# Patient Record
Sex: MALE | Race: WHITE | NOT HISPANIC OR LATINO | Employment: OTHER | URBAN - NONMETROPOLITAN AREA
[De-identification: names, ages, dates, MRNs, and addresses within clinical notes are randomized per-mention and may not be internally consistent; named-entity substitution may affect disease eponyms.]

---

## 2021-02-02 ENCOUNTER — IMMUNIZATION (OUTPATIENT)
Dept: VACCINE CLINIC | Facility: HOSPITAL | Age: 71
End: 2021-02-02

## 2021-02-02 PROCEDURE — 0001A: CPT | Performed by: INTERNAL MEDICINE

## 2021-02-02 PROCEDURE — 91300 HC SARSCOV02 VAC 30MCG/0.3ML IM: CPT | Performed by: INTERNAL MEDICINE

## 2021-02-23 ENCOUNTER — IMMUNIZATION (OUTPATIENT)
Dept: VACCINE CLINIC | Facility: HOSPITAL | Age: 71
End: 2021-02-23

## 2021-02-23 PROCEDURE — 91300 HC SARSCOV02 VAC 30MCG/0.3ML IM: CPT | Performed by: INTERNAL MEDICINE

## 2021-02-23 PROCEDURE — 0002A: CPT | Performed by: INTERNAL MEDICINE

## 2022-12-06 ENCOUNTER — TELEPHONE (OUTPATIENT)
Dept: CARDIOLOGY | Facility: CLINIC | Age: 72
End: 2022-12-06

## 2022-12-06 NOTE — TELEPHONE ENCOUNTER
For the HUB to read to pt:       LEFT VOICEMAIL FOR PT TO CALL BACK TO CONFIRM APPT, IF PT CALLS BACK PLEASE PUT CALL THROUGH. THANK YOU

## 2022-12-08 ENCOUNTER — OFFICE VISIT (OUTPATIENT)
Dept: CARDIOLOGY | Facility: CLINIC | Age: 72
End: 2022-12-08

## 2022-12-08 VITALS
SYSTOLIC BLOOD PRESSURE: 154 MMHG | WEIGHT: 183.4 LBS | HEIGHT: 71 IN | OXYGEN SATURATION: 100 % | BODY MASS INDEX: 25.68 KG/M2 | DIASTOLIC BLOOD PRESSURE: 83 MMHG | HEART RATE: 69 BPM

## 2022-12-08 DIAGNOSIS — R00.2 PALPITATIONS: ICD-10-CM

## 2022-12-08 DIAGNOSIS — R07.2 CHEST PAIN, PRECORDIAL: ICD-10-CM

## 2022-12-08 DIAGNOSIS — R42 DIZZY: ICD-10-CM

## 2022-12-08 DIAGNOSIS — M79.89 LEG SWELLING: ICD-10-CM

## 2022-12-08 DIAGNOSIS — I10 PRIMARY HYPERTENSION: Primary | ICD-10-CM

## 2022-12-08 PROCEDURE — 99204 OFFICE O/P NEW MOD 45 MIN: CPT | Performed by: NURSE PRACTITIONER

## 2022-12-08 RX ORDER — DOXAZOSIN MESYLATE 4 MG/1
4 TABLET ORAL NIGHTLY
COMMUNITY
End: 2022-12-08 | Stop reason: SDUPTHER

## 2022-12-08 RX ORDER — DONEPEZIL HYDROCHLORIDE 5 MG/1
5 TABLET, FILM COATED ORAL NIGHTLY
COMMUNITY

## 2022-12-08 RX ORDER — POTASSIUM CHLORIDE 750 MG/1
10 TABLET, FILM COATED, EXTENDED RELEASE ORAL 2 TIMES DAILY
COMMUNITY

## 2022-12-08 RX ORDER — LISINOPRIL 40 MG/1
40 TABLET ORAL DAILY
COMMUNITY

## 2022-12-08 RX ORDER — FINASTERIDE 5 MG/1
5 TABLET, FILM COATED ORAL DAILY
COMMUNITY

## 2022-12-08 RX ORDER — MIRTAZAPINE 15 MG/1
15 TABLET, FILM COATED ORAL NIGHTLY
COMMUNITY

## 2022-12-08 RX ORDER — METOPROLOL SUCCINATE 25 MG/1
25 TABLET, EXTENDED RELEASE ORAL DAILY
COMMUNITY

## 2022-12-08 RX ORDER — CETIRIZINE HYDROCHLORIDE 10 MG/1
10 TABLET ORAL DAILY
COMMUNITY

## 2022-12-08 RX ORDER — ASPIRIN 81 MG/1
81 TABLET ORAL DAILY
COMMUNITY

## 2022-12-08 RX ORDER — DOXAZOSIN 8 MG/1
8 TABLET ORAL NIGHTLY
Qty: 90 TABLET | Refills: 3 | Status: SHIPPED | OUTPATIENT
Start: 2022-12-08 | End: 2023-02-24 | Stop reason: SDUPTHER

## 2022-12-08 RX ORDER — FUROSEMIDE 20 MG/1
20 TABLET ORAL DAILY
COMMUNITY

## 2022-12-08 RX ORDER — MAG HYDROX/ALUMINUM HYD/SIMETH 400-400-40
SUSPENSION, ORAL (FINAL DOSE FORM) ORAL
COMMUNITY

## 2022-12-08 RX ORDER — BUSPIRONE HYDROCHLORIDE 5 MG/1
5 TABLET ORAL DAILY
COMMUNITY

## 2022-12-08 NOTE — PROGRESS NOTES
"Subjective     Miguel Allen is a 72 y.o. male who presents to day for Edema (Lower extremity edema).    CHIEF COMPLIANT  Chief Complaint   Patient presents with   • Edema     Lower extremity edema       Active Problems:  Problem List Items Addressed This Visit    None      HPI  HPI    Mr. Miguel Allen is a 72-year-old male who presents today to establish care for cardiac evaluation due to lower extremity edema and angina. Patient does have chronic arterial hypertension, which he is treated with Cardura, lisinopril, metoprolol. Today, his blood pressure is elevated at 154/83 mmHg, heart rate of 69. He is on antiplatelet therapy of aspirin 81 mg daily. Diuretic therapy of Lasix 20 mg daily. He is accompanied by an adult female.    The patient denies feeling like \" someone is sitting on his chest\". He states it feels like something he know but his mother used to call his winter-summer cold and then it gets cold then he'll start to experience edema in his lower extremity. The adult female states he had a severe episode previously where he had gotten up and down frequently, had taken Tums sits down and feels discomfort in his chest area. She states she did not know if it was a muscular issue, as his symptoms were onset a few months ago. Notes he has a history of various issues, had a abdominal aneurysm surgery, bladder issues, prostate issues, kidney stone issues, everything had come at one time. She had thought he had a stroke, states they were in the house one day and all of a sudden, he comes over to the kitchen and he just started experiencing dysarthria.     She conducted a stroke test (FAST)  and decided she needed some help so she called the ambulance and the patient was rushed to the hospital. She mentions she checked for a stroke but could not find anything. Notes his blood pressure was 280/125 mmHg so they had started to lower it slowly and he had started to feel relieved within a couple hours, then " he started experiencing urinary incontinence. She states a prostate ultrasound was conducted and it was found that his prostate had covered his bladder, so that is what they believed caused the hypertensive blood pressure reading.    Mentions he was admitted in the hospital and during the ultrasound scan they had found an abdominal aortic aneurysm of 6.1 cm and did not even know it was there. Notes they didn't want to do anything till Tuesday, that encounter was on previous Friday. So Dr. Escalante got him in on Tuesday and then they went to the neurologist who came in and said they were not going to do anything about the prostate at the time because he had been through so much. And so the adult female states he said we'll still do anything about the bladder and get the aneurysm all settled and taken care of so then they went to him and he said to keep him on the medicines to see if we can get it down so he can use the bathroom okay.    She states during the meantime, they felt like he had a old kidney stone had lodged in his left kidney, destroyed the kidney, been there about they figured about 10 years and didn't know it. So all this pain she believes over the 10-year period of his back hurting all the time and his left flank pain was coming from that kidney stone but the doctor said let's don't do anything because that's going to upset the prostate and the bladder and his mind was already gone.     But then now she told Dr. More last week she noticed he don't use his left side, he won't grab anything from his left side, and he won't look to his left side.  If something sitting in the counter and she pointed out to him he won't look left and he won't go to the bathroom. The patient would ask where is the bathroom, but she know he said to look on the left, he'll look on the right instead. She notes Dr. More said it's called neglect.     But when I checked in the neglect it has something to do with his brain lobe up  here. So he is. States 10 years ago, they had gotten referred to a cardiologist because he would experience dyspnea upon exertion when walking up a hill so she suggested to get it checked out but they did not find anything. She states that currently they do not do enough to put stress on his body because he has Parkinson's as well as dementia, he does not do enough to exert his body since he does not do anything around the house anymore.    The patient states he used to run about 2 miles a day. He denies experiencing any dyspnea currently, but it does come into play if he does exert himself a little. She had put up with his symptoms for a little while thinking it was a muscular or skeletal problem but then the patient started talking about how his symptoms are affecting him more than usual. At night when she would lay down, he will really think it's hurting and he will get up for a while and walk around. She states she has noticed some lower extremity edema, she would give him Lasix and the edema will eventually go down. Notes she had given him 1 tablet this morning.     The adult female states they have been faithful about presenting at doctor appointments and nothing had shown up during his tests. Medical issues always showed up after his test results showed normal. She states Dr. More said the patient blood pressure reading are still too high, and he would need to be seen by a cardiologist as soon as possible. Denied any positive results from testing that was conducted 10 years ago. She mentions back then he wasn't apart of his medical care, but she believes he worked with Dr. Corey at the time.    The adult female agrees to having diagnostic testing conducted for the patient.      PRIOR MEDS  Current Outpatient Medications on File Prior to Visit   Medication Sig Dispense Refill   • aspirin 81 MG EC tablet Take 81 mg by mouth Daily.     • busPIRone (BUSPAR) 5 MG tablet Take 5 mg by mouth Daily.     •  cetirizine (zyrTEC) 10 MG tablet Take 10 mg by mouth Daily.     • Cinnamon 500 MG tablet Take 500 mg by mouth Daily. 2 tabs po qd     • DICLOFENAC PO Take 50 mg by mouth 2 (Two) Times a Day.     • donepezil (ARICEPT) 5 MG tablet Take 5 mg by mouth Every Night.     • doxazosin (CARDURA) 4 MG tablet Take 4 mg by mouth Every Night.     • finasteride (PROSCAR) 5 MG tablet Take 5 mg by mouth Daily.     • furosemide (LASIX) 20 MG tablet Take 20 mg by mouth Daily.     • lisinopril (PRINIVIL,ZESTRIL) 40 MG tablet Take 40 mg by mouth Daily.     • metoprolol succinate XL (TOPROL-XL) 25 MG 24 hr tablet Take 25 mg by mouth Daily.     • mirtazapine (REMERON) 15 MG tablet Take 15 mg by mouth Every Night.     • Omega-3 Fatty Acids (FISH OIL PO) Take 1,400 mg by mouth Daily.     • potassium chloride 10 MEQ CR tablet Take 10 mEq by mouth 2 (Two) Times a Day.     • Saw Palmetto 450 MG capsule Take  by mouth.     • Turmeric (QC TUMERIC COMPLEX PO) Take 500 mg by mouth Daily.     • VITAMIN D, CHOLECALCIFEROL, PO Take 125 mcg by mouth Daily.       No current facility-administered medications on file prior to visit.       ALLERGIES  Patient has no known allergies.    HISTORY  Past Medical History:   Diagnosis Date   • Aneurysm (HCC)    • Arthritis    • Colon polyps    • Gall stones    • GERD (gastroesophageal reflux disease)    • Hyperlipidemia    • Hypertension    • Kidney stones    • Lewy body dementia (HCC)    • Parkinson's disease (HCC)    • Stroke (HCC)        Social History     Socioeconomic History   • Marital status:    Tobacco Use   • Smoking status: Former     Packs/day: 1.00     Years: 10.00     Pack years: 10.00     Types: Cigarettes   • Smokeless tobacco: Never   Vaping Use   • Vaping Use: Never used   Substance and Sexual Activity   • Alcohol use: Never   • Drug use: Never   • Sexual activity: Defer       Family History   Problem Relation Age of Onset   • Dementia Mother    • Hypertension Father    • Diabetes Father   "      Review of Systems   Constitutional: Negative for chills, fatigue and fever.   HENT: Negative for congestion, rhinorrhea and sore throat.    Respiratory: Positive for chest tightness. Negative for shortness of breath.    Cardiovascular: Positive for chest pain (ffels like something sitting on chest  feels better to push on chest ), palpitations and leg swelling.   Gastrointestinal: Positive for constipation.   Musculoskeletal: Positive for back pain, myalgias, neck pain and neck stiffness.   Allergic/Immunologic: Negative for environmental allergies and food allergies.   Neurological: Positive for light-headedness (motion  causes this  or getting up to quickly). Negative for dizziness, syncope and weakness.   Hematological: Does not bruise/bleed easily.   Psychiatric/Behavioral: Positive for confusion (has Lewy Body  and parkinsons), decreased concentration and hallucinations. The patient is nervous/anxious.        Objective     VITALS: /83 (BP Location: Right arm, Patient Position: Sitting)   Pulse 69   Ht 180.3 cm (71\")   Wt 83.2 kg (183 lb 6.4 oz)   SpO2 100%   BMI 25.58 kg/m²     LABS:   Lab Results (most recent)     None          IMAGING:   No Images in the past 120 days found..    EXAM:  Physical Exam  Vitals and nursing note reviewed.   Constitutional:       Appearance: He is well-developed.   HENT:      Head: Normocephalic and atraumatic.   Eyes:      Pupils: Pupils are equal, round, and reactive to light.   Neck:      Vascular: No carotid bruit or JVD.   Cardiovascular:      Rate and Rhythm: Normal rate and regular rhythm. Occasional extrasystoles are present.     Pulses:           Carotid pulses are 2+ on the right side and 2+ on the left side.       Radial pulses are 2+ on the right side and 2+ on the left side.      Heart sounds: No murmur heard.    Gallop present.   Pulmonary:      Effort: Pulmonary effort is normal. No respiratory distress.      Breath sounds: Normal breath sounds. "   Abdominal:      General: Bowel sounds are normal. There is no distension.      Palpations: Abdomen is soft.      Tenderness: There is no abdominal tenderness.   Musculoskeletal:         General: Swelling (trace) present. Normal range of motion.      Cervical back: Neck supple.   Skin:     General: Skin is warm and dry.   Neurological:      Mental Status: He is alert and oriented to person, place, and time.      Cranial Nerves: No cranial nerve deficit.      Sensory: No sensory deficit.   Psychiatric:         Speech: Speech normal.         Behavior: Behavior normal.         Thought Content: Thought content normal.         Judgment: Judgment normal.         Procedure   Procedures       Assessment & Plan    Diagnosis Plan   1. Primary hypertension  doxazosin (CARDURA) 8 MG tablet    Stress Test With Myocardial Perfusion One Day    Adult Transthoracic Echo Complete W/ Cont if Necessary Per Protocol    Duplex Carotid Ultrasound CAR    Holter Monitor - 24 Hour      2. Chest pain, precordial  Stress Test With Myocardial Perfusion One Day    Adult Transthoracic Echo Complete W/ Cont if Necessary Per Protocol    Duplex Carotid Ultrasound CAR    Holter Monitor - 24 Hour      3. Palpitations  Stress Test With Myocardial Perfusion One Day    Adult Transthoracic Echo Complete W/ Cont if Necessary Per Protocol    Duplex Carotid Ultrasound CAR    Holter Monitor - 24 Hour      4. Leg swelling  Stress Test With Myocardial Perfusion One Day    Adult Transthoracic Echo Complete W/ Cont if Necessary Per Protocol    Duplex Carotid Ultrasound CAR    Holter Monitor - 24 Hour      5. Dizzy  Stress Test With Myocardial Perfusion One Day    Adult Transthoracic Echo Complete W/ Cont if Necessary Per Protocol    Duplex Carotid Ultrasound CAR    Holter Monitor - 24 Hour          1. I will increase Cardura prescription to 8 mg. Patient will continue to follow prescription regimen for his chronic arterial hypertension.  They will continue to  monitor his blood pressure on a routine basis report any significant highs or lows.  Goal blood pressure 130/80 was discussed.    2.  I would like for patient to go under stress test and echocardiogram further evaluation of ischemia for a potential cause of his symptoms.  Testing was discussed and they wish to proceed.    3.  Also like for patient to have a carotid duplex to rule out carotid artery disease as a potential cause of his symptoms.  In addition to this I like for him to wear a Holter monitor to evaluate for potential A. fib as a potential cause of his potential TIA.    4.  Informed of signs and symptoms of ACS and advised to seek emergent treatment for any new worsening symptoms.  Patient also advised sooner follow-up as needed.  Also advised to follow-up with family doctor as needed  This note is dictated utilizing voice recognition software.  Although this record has been proof read, transcriptional errors may still be present. If questions occur regarding the content of this record please do not hesitate to call our office.  I have reviewed and confirmed the accuracy of the ROS as documented by the MA/LPN/RN JOHN Gonzalez      No follow-ups on file.    There are no diagnoses linked to this encounter.    Miguel Allen  reports that he has quit smoking. His smoking use included cigarettes. He has a 10.00 pack-year smoking history. He has never used smokeless tobacco.. I have educated him on the risk of diseases from using tobacco products.     Advance Care Planning   ACP discussion was held with the patient during this visit. Patient has an advance directive (not in EMR), copy requested.          MEDS ORDERED DURING VISIT:  No orders of the defined types were placed in this encounter.          This document has been electronically signed by JHON Gonzalez Jr.  December 8, 2022 15:32 EST    Transcribed from ambient dictation for JOHN Gonzalez by Gwen Sibley.  12/09/22   08:27  EST    Patient or patient representative verbalized consent to the visit recording.  I have personally performed the services described in this document as transcribed by the above individual, and it is both accurate and complete.

## 2023-01-04 ENCOUNTER — LAB (OUTPATIENT)
Dept: LAB | Facility: HOSPITAL | Age: 73
End: 2023-01-04
Payer: MEDICARE

## 2023-01-04 ENCOUNTER — TRANSCRIBE ORDERS (OUTPATIENT)
Dept: LAB | Facility: HOSPITAL | Age: 73
End: 2023-01-04
Payer: MEDICARE

## 2023-01-04 DIAGNOSIS — I71.40 ABDOMINAL AORTIC ANEURYSM (AAA) WITHOUT RUPTURE, UNSPECIFIED PART: ICD-10-CM

## 2023-01-04 DIAGNOSIS — Z95.828 HISTORY OF ENDOVASCULAR STENT GRAFT FOR ABDOMINAL AORTIC ANEURYSM: ICD-10-CM

## 2023-01-04 DIAGNOSIS — I10 HYPERTENSION, UNSPECIFIED TYPE: ICD-10-CM

## 2023-01-04 DIAGNOSIS — Z86.79 PERSONAL HISTORY OF OTHER DISEASES OF CIRCULATORY SYSTEM: ICD-10-CM

## 2023-01-04 DIAGNOSIS — I83.893 VARICOSE VEINS OF LOWER EXTREMITIES WITH COMPLICATIONS, BILATERAL: ICD-10-CM

## 2023-01-04 DIAGNOSIS — I71.40 ABDOMINAL AORTIC ANEURYSM (AAA) WITHOUT RUPTURE, UNSPECIFIED PART: Primary | ICD-10-CM

## 2023-01-04 LAB
ANION GAP SERPL CALCULATED.3IONS-SCNC: 13 MMOL/L (ref 5–15)
BASOPHILS # BLD AUTO: 0.04 10*3/MM3 (ref 0–0.2)
BASOPHILS NFR BLD AUTO: 0.4 % (ref 0–1.5)
BUN SERPL-MCNC: 16 MG/DL (ref 8–23)
BUN/CREAT SERPL: 13.2 (ref 7–25)
CALCIUM SPEC-SCNC: 9.3 MG/DL (ref 8.6–10.5)
CHLORIDE SERPL-SCNC: 102 MMOL/L (ref 98–107)
CO2 SERPL-SCNC: 25 MMOL/L (ref 22–29)
CREAT SERPL-MCNC: 1.21 MG/DL (ref 0.76–1.27)
DEPRECATED RDW RBC AUTO: 45.8 FL (ref 37–54)
EGFRCR SERPLBLD CKD-EPI 2021: 62.8 ML/MIN/1.73
EOSINOPHIL # BLD AUTO: 0.12 10*3/MM3 (ref 0–0.4)
EOSINOPHIL NFR BLD AUTO: 1.2 % (ref 0.3–6.2)
ERYTHROCYTE [DISTWIDTH] IN BLOOD BY AUTOMATED COUNT: 14.1 % (ref 12.3–15.4)
GLUCOSE SERPL-MCNC: 108 MG/DL (ref 65–99)
HCT VFR BLD AUTO: 45 % (ref 37.5–51)
HGB BLD-MCNC: 14.5 G/DL (ref 13–17.7)
IMM GRANULOCYTES # BLD AUTO: 0.04 10*3/MM3 (ref 0–0.05)
IMM GRANULOCYTES NFR BLD AUTO: 0.4 % (ref 0–0.5)
LYMPHOCYTES # BLD AUTO: 1.48 10*3/MM3 (ref 0.7–3.1)
LYMPHOCYTES NFR BLD AUTO: 14.5 % (ref 19.6–45.3)
MCH RBC QN AUTO: 28.5 PG (ref 26.6–33)
MCHC RBC AUTO-ENTMCNC: 32.2 G/DL (ref 31.5–35.7)
MCV RBC AUTO: 88.4 FL (ref 79–97)
MONOCYTES # BLD AUTO: 0.55 10*3/MM3 (ref 0.1–0.9)
MONOCYTES NFR BLD AUTO: 5.4 % (ref 5–12)
NEUTROPHILS NFR BLD AUTO: 78.1 % (ref 42.7–76)
NEUTROPHILS NFR BLD AUTO: 8.01 10*3/MM3 (ref 1.7–7)
NRBC BLD AUTO-RTO: 0 /100 WBC (ref 0–0.2)
PLATELET # BLD AUTO: 191 10*3/MM3 (ref 140–450)
PMV BLD AUTO: 9.8 FL (ref 6–12)
POTASSIUM SERPL-SCNC: 4.8 MMOL/L (ref 3.5–5.2)
RBC # BLD AUTO: 5.09 10*6/MM3 (ref 4.14–5.8)
SODIUM SERPL-SCNC: 140 MMOL/L (ref 136–145)
WBC NRBC COR # BLD: 10.24 10*3/MM3 (ref 3.4–10.8)

## 2023-01-04 PROCEDURE — 85025 COMPLETE CBC W/AUTO DIFF WBC: CPT | Performed by: SURGERY

## 2023-01-04 PROCEDURE — 80048 BASIC METABOLIC PNL TOTAL CA: CPT | Performed by: SURGERY

## 2023-01-06 ENCOUNTER — CLINICAL SUPPORT (OUTPATIENT)
Dept: CARDIOLOGY | Facility: CLINIC | Age: 73
End: 2023-01-06
Payer: MEDICARE

## 2023-01-06 DIAGNOSIS — R07.2 CHEST PAIN, PRECORDIAL: Primary | ICD-10-CM

## 2023-01-06 DIAGNOSIS — R00.2 PALPITATIONS: ICD-10-CM

## 2023-01-09 ENCOUNTER — HOSPITAL ENCOUNTER (OUTPATIENT)
Dept: CARDIOLOGY | Facility: HOSPITAL | Age: 73
Discharge: HOME | End: 2023-01-09
Payer: MEDICARE

## 2023-01-09 DIAGNOSIS — R42 DIZZY: ICD-10-CM

## 2023-01-09 DIAGNOSIS — M79.89 LEG SWELLING: ICD-10-CM

## 2023-01-09 DIAGNOSIS — I10 PRIMARY HYPERTENSION: ICD-10-CM

## 2023-01-09 DIAGNOSIS — R00.2 PALPITATIONS: ICD-10-CM

## 2023-01-09 DIAGNOSIS — R07.2 CHEST PAIN, PRECORDIAL: ICD-10-CM

## 2023-01-09 LAB
BH CV REST NUCLEAR ISOTOPE DOSE: 10 MCI
BH CV STRESS COMMENTS STAGE 1: NORMAL
BH CV STRESS DOSE REGADENOSON STAGE 1: 0.4
BH CV STRESS DURATION MIN STAGE 1: 0
BH CV STRESS DURATION SEC STAGE 1: 10
BH CV STRESS NUCLEAR ISOTOPE DOSE: 30 MCI
BH CV STRESS PROTOCOL 1: NORMAL
BH CV STRESS RECOVERY BP: NORMAL MMHG
BH CV STRESS RECOVERY HR: 76 BPM
BH CV STRESS STAGE 1: 1
MAXIMAL PREDICTED HEART RATE: 146 BPM
PERCENT MAX PREDICTED HR: 47.26 %
STRESS BASELINE BP: NORMAL MMHG
STRESS BASELINE HR: 63 BPM
STRESS PERCENT HR: 56 %
STRESS POST PEAK BP: NORMAL MMHG
STRESS POST PEAK HR: 69 BPM
STRESS TARGET HR: 124 BPM

## 2023-01-09 PROCEDURE — 93306 TTE W/DOPPLER COMPLETE: CPT

## 2023-01-09 PROCEDURE — A9500 TC99M SESTAMIBI: HCPCS | Performed by: INTERNAL MEDICINE

## 2023-01-09 PROCEDURE — 93017 CV STRESS TEST TRACING ONLY: CPT

## 2023-01-09 PROCEDURE — 0 TECHNETIUM SESTAMIBI: Performed by: INTERNAL MEDICINE

## 2023-01-09 PROCEDURE — 78452 HT MUSCLE IMAGE SPECT MULT: CPT | Performed by: INTERNAL MEDICINE

## 2023-01-09 PROCEDURE — 78452 HT MUSCLE IMAGE SPECT MULT: CPT

## 2023-01-09 PROCEDURE — 93306 TTE W/DOPPLER COMPLETE: CPT | Performed by: INTERNAL MEDICINE

## 2023-01-09 PROCEDURE — 93880 EXTRACRANIAL BILAT STUDY: CPT | Performed by: INTERNAL MEDICINE

## 2023-01-09 PROCEDURE — 93880 EXTRACRANIAL BILAT STUDY: CPT

## 2023-01-09 PROCEDURE — 93018 CV STRESS TEST I&R ONLY: CPT | Performed by: INTERNAL MEDICINE

## 2023-01-09 RX ADMIN — TECHNETIUM TC 99M SESTAMIBI 1 DOSE: 1 INJECTION INTRAVENOUS at 09:59

## 2023-01-09 RX ADMIN — TECHNETIUM TC 99M SESTAMIBI 1 DOSE: 1 INJECTION INTRAVENOUS at 08:15

## 2023-01-10 ENCOUNTER — TELEPHONE (OUTPATIENT)
Dept: CARDIOLOGY | Facility: CLINIC | Age: 73
End: 2023-01-10
Payer: MEDICARE

## 2023-01-10 NOTE — PROGRESS NOTES
Patient has a positive stress test.  Upon original follow-up and stress test was ordered patient was symptomatic.  Would recommend 1 to 2-week follow-up.

## 2023-01-10 NOTE — TELEPHONE ENCOUNTER
For the HUB to read to pt:       Called pt to schedule appt to discuss stress test, pt stated he does not take care of his appt's, his wife does, I informed him I called his wife's number first but it was disconnected, I asked pt if I could schedule the appt with him, pt declined. I asked pt to have his wife call back and ask for [place], that it was important that she call me right away to schedule his appt. Pt voiced understanding.

## 2023-01-10 NOTE — TELEPHONE ENCOUNTER
----- Message from JOHN Gonzalez sent at 1/10/2023 10:22 AM EST -----  Patient has a positive stress test.  Upon original follow-up and stress test was ordered patient was symptomatic.  Would recommend 1 to 2-week follow-up.    I called and left a message on voicemail of positive stress test . I also advised of need  For 1-2 week follow up . Will forward to front office for scheduling of apt.      Ute BARR

## 2023-01-17 ENCOUNTER — OFFICE VISIT (OUTPATIENT)
Dept: CARDIOLOGY | Facility: CLINIC | Age: 73
End: 2023-01-17
Payer: MEDICARE

## 2023-01-17 VITALS
BODY MASS INDEX: 25.45 KG/M2 | HEIGHT: 71 IN | HEART RATE: 72 BPM | SYSTOLIC BLOOD PRESSURE: 142 MMHG | WEIGHT: 181.8 LBS | DIASTOLIC BLOOD PRESSURE: 80 MMHG | OXYGEN SATURATION: 100 %

## 2023-01-17 DIAGNOSIS — R07.2 CHEST PAIN, PRECORDIAL: ICD-10-CM

## 2023-01-17 DIAGNOSIS — R00.2 PALPITATIONS: ICD-10-CM

## 2023-01-17 DIAGNOSIS — I10 PRIMARY HYPERTENSION: ICD-10-CM

## 2023-01-17 DIAGNOSIS — R94.39 POSITIVE CARDIAC STRESS TEST: Primary | ICD-10-CM

## 2023-01-17 DIAGNOSIS — R42 DIZZY: ICD-10-CM

## 2023-01-17 PROCEDURE — 99214 OFFICE O/P EST MOD 30 MIN: CPT | Performed by: NURSE PRACTITIONER

## 2023-01-17 RX ORDER — NITROGLYCERIN 0.4 MG/1
TABLET SUBLINGUAL
Qty: 30 TABLET | Refills: 5 | Status: SHIPPED | OUTPATIENT
Start: 2023-01-17

## 2023-01-17 RX ORDER — METOPROLOL TARTRATE 100 MG/1
TABLET ORAL
Qty: 2 TABLET | Refills: 0 | Status: SHIPPED | OUTPATIENT
Start: 2023-01-17 | End: 2023-02-24

## 2023-01-17 NOTE — PROGRESS NOTES
Subjective     Sherry Jones is a 72 y.o. male who presents to day for abnormal stress and Hypertension.    CHIEF COMPLIANT  Chief Complaint   Patient presents with   • abnormal stress   • Hypertension       Active Problems:  Problem List Items Addressed This Visit    None  Problem list  1.  Chest pain  1.1 stress test 1/23: Inferior basilar, diaphragmatic, and inferior lateral infarct with moderate jessica-infarct ischemia in the more septal portions of the inferior wall as well as the basilar portion of the inferior lateral wall, post-rest ejection fraction 62%  2.  Palpitations  2.1 Holter monitor 12/22: PSVT with a 1% PAC burden, 1 run VT lasting 3 beats.  Bradycardic 34.5% of the time.  Symptoms occurred during sinus bradycardia to sinus tachycardia  3.  Hypertension  4.  Lower extremity edema    HPI  HPI     SHERRY JONES is a 72-year-old male patient being followed up today for abnormal stress test. He is accompanied by an adult female. The patient consents to being recorded with ANNIE.    Patient's stress test identified a inferior basilar diaphragmatic and inferior lateral infarct with moderate jessica-infarct ischemia in the more septal appropriations of the inferior wall as well as the basilar portion of the inferolateral wall. Preserved post-rest ejection fraction of 62 percent.     Patient previously reported chest pain as it  someone was sitting on his chest. Patient states that he has had severe episodes that has gotten more frequently.     The adult female states that the patient has been experiencing palpitations. She states that the he felt like someone hit him and someone was pressing on his chest. The patient states that this is the only chest pain he has had. He denies shortness of breath. The adult female states that the patient never complains about nausea. The patient states that he went without oxygen at some point and that is what caused the palpitations.    The patient states that he did  not have any symptoms while he was wearing the monitor.    The adult female states that sometimes the patient can tell that his heart is racing. The adult female states that he can feel the trauma of it when he gets nervous and traumatized. The adult female states that he can not take statins because of his Parkinson's. The adult female states that the statins works against him and he just aches and hurts. The patient states that he has a lot of hallucinations.     The patient states that one of the worst ones he had was when he was in an empty swimming pool. He states that as soon as she makes contact in reality, it goes away and clears itself up. The patient states that the nightmare becomes always nothing. The adult female states that he will ask her if what he says is hallucination or real.     The adult female states that she has to differentiate at what he is looking at and seeing to determine if it is a hallucination.. The adult female states that she is not sure if that correlates with his heart or not. The patient states that it was really stressful to go through the kind of nightmare. The adult female states that 10 years ago, the patient was having problems walking up a hill and he would get short of breath. The adult female states that Dr. More ordered a work-up, but they did not find anything. The adult female states that they took pictures of acid 10 years ago.    The female adult accompanying the patient inquires if the patient can have an angiogram performed. She states the nurse practitioner was concerned about the patient's carotid testing. The adult female inquired if the patient should have the angiogram performed. The female accompanying the patient inquires if I would let the patient know if he is cleared to undergo the angiogram. The female accompanying the patient states the patient's carotid testing will be rescheduled.      His cardiac monitor did not indicate any atrial fibrillation or  flutter.  His symptoms mainly occur during sinus bradycardia and sinus rhythm.  He did have 23 runs of supraventricular tachycardia with the longest being 46 beats with rates up to 153 bpm.  His overall PAC burden is less than 1%.  In addition to this he did have 1 run of ventricular tachycardia that only lasted 3 beats with rates up to 160 bpm.      PRIOR MEDS  Current Outpatient Medications on File Prior to Visit   Medication Sig Dispense Refill   • aspirin 81 MG EC tablet Take 81 mg by mouth Daily.     • busPIRone (BUSPAR) 5 MG tablet Take 5 mg by mouth Daily.     • cetirizine (zyrTEC) 10 MG tablet Take 10 mg by mouth Daily.     • Cinnamon 500 MG tablet Take 500 mg by mouth Daily. 2 tabs po qd     • DICLOFENAC PO Take 50 mg by mouth 2 (Two) Times a Day.     • donepezil (ARICEPT) 5 MG tablet Take 5 mg by mouth Every Night.     • doxazosin (CARDURA) 8 MG tablet Take 1 tablet by mouth Every Night. 90 tablet 3   • finasteride (PROSCAR) 5 MG tablet Take 5 mg by mouth Daily.     • furosemide (LASIX) 20 MG tablet Take 20 mg by mouth Daily.     • lisinopril (PRINIVIL,ZESTRIL) 40 MG tablet Take 40 mg by mouth Daily.     • metoprolol succinate XL (TOPROL-XL) 25 MG 24 hr tablet Take 25 mg by mouth Daily.     • mirtazapine (REMERON) 15 MG tablet Take 15 mg by mouth Every Night.     • Omega-3 Fatty Acids (FISH OIL PO) Take 1,400 mg by mouth Daily.     • potassium chloride 10 MEQ CR tablet Take 10 mEq by mouth 2 (Two) Times a Day.     • Saw Palmetto 450 MG capsule Take  by mouth.     • Turmeric (QC TUMERIC COMPLEX PO) Take 500 mg by mouth Daily.     • VITAMIN D, CHOLECALCIFEROL, PO Take 125 mcg by mouth Daily.       No current facility-administered medications on file prior to visit.       ALLERGIES  Patient has no known allergies.    HISTORY  Past Medical History:   Diagnosis Date   • Aneurysm (HCC)    • Arthritis    • Colon polyps    • Gall stones    • GERD (gastroesophageal reflux disease)    • Hyperlipidemia    •  "Hypertension    • Kidney stones    • Lewy body dementia (HCC)    • Parkinson's disease (HCC)    • Stroke (HCC)        Social History     Socioeconomic History   • Marital status:    Tobacco Use   • Smoking status: Former     Packs/day: 1.00     Years: 10.00     Pack years: 10.00     Types: Cigarettes   • Smokeless tobacco: Never   Vaping Use   • Vaping Use: Never used   Substance and Sexual Activity   • Alcohol use: Never   • Drug use: Never   • Sexual activity: Defer       Family History   Problem Relation Age of Onset   • Dementia Mother    • Hypertension Father    • Diabetes Father        Review of Systems   Constitutional: Negative for chills, fatigue and fever.   HENT: Negative for congestion, rhinorrhea and sore throat.    Respiratory: Positive for chest tightness. Negative for shortness of breath.    Cardiovascular: Positive for chest pain. Negative for palpitations and leg swelling.   Gastrointestinal: Positive for constipation. Negative for diarrhea and nausea.   Musculoskeletal: Positive for arthralgias, back pain and neck pain.   Allergic/Immunologic: Negative for environmental allergies and food allergies.   Neurological: Positive for dizziness (getting up to quick), weakness and light-headedness. Negative for syncope.   Hematological: Does not bruise/bleed easily.   Psychiatric/Behavioral: Negative for sleep disturbance.       Objective     VITALS: /80 (BP Location: Right arm, Patient Position: Sitting, Cuff Size: Adult)   Pulse 72   Ht 180.3 cm (70.98\")   Wt 82.5 kg (181 lb 12.8 oz)   SpO2 100%   BMI 25.37 kg/m²     LABS:   Lab Results (most recent)     None        The patient's stress test identified an inferior basilar diaphragmatic and inferior lateral infarct with moderate jessica-infarct ischemia in the more septal appropriations of the inferior wall as well as the basilar portion of the inferolateral wall. Preserved post-rest ejection fraction of 62 percent.    IMAGING:   No Images " in the past 120 days found..    EXAM:  Physical Exam  Vitals and nursing note reviewed.   Constitutional:       Appearance: He is well-developed.   HENT:      Head: Normocephalic.   Eyes:      Pupils: Pupils are equal, round, and reactive to light.   Neck:      Thyroid: No thyroid mass.      Vascular: No carotid bruit or JVD.      Trachea: Trachea and phonation normal.   Cardiovascular:      Rate and Rhythm: Normal rate and regular rhythm.      Pulses:           Radial pulses are 2+ on the right side and 2+ on the left side.        Posterior tibial pulses are 2+ on the right side and 2+ on the left side.      Heart sounds: No murmur heard.    No friction rub. Gallop present.   Pulmonary:      Effort: Pulmonary effort is normal. No respiratory distress.      Breath sounds: Normal breath sounds. No wheezing or rales.   Abdominal:      General: Bowel sounds are normal.      Palpations: Abdomen is soft.   Musculoskeletal:         General: Swelling (trace) present. Normal range of motion.      Cervical back: Neck supple.   Skin:     General: Skin is warm and dry.      Capillary Refill: Capillary refill takes less than 2 seconds.      Findings: No rash.   Neurological:      Mental Status: He is alert and oriented to person, place, and time.   Psychiatric:         Speech: Speech normal.         Behavior: Behavior normal.         Thought Content: Thought content normal.         Judgment: Judgment normal.         Procedure   Procedures       Assessment & Plan      1. The patient's recent stress test was positive. The fixed portion defect described in number 1 above could also represent attenuation artifact. Additionally, very high grade stenosis can present in a similar fashion. Therefore, the decision on the need for further evaluation should integrate the above findings with overall assessment and clinical risk.  Due to his ventricular tachycardia and chest pain I do feel it is appropriate to move forth with further  ischemic evaluation.  We did discuss stress test, CTA of the heart, and medical management.  2. The patient will continue taking metoprolol as prescribed.  3.  Patient has elected to undergo CTA of the heart.  I will prescribe metoprolol 100 mg to be taken the morning of the CTA and to take an additional 100 mg dose with him to the procedure and only take if advised by the radiologist.  Patient also advised to take nitroglycerin with him as well.  In addition I will assess patient's BMP prior to the CTA to evaluate renal function.  4.  A prescription for nitroglycerin will be sent to the patient's pharmacy. He was instructed to take 1 dose every 5 minutes up to 3 doses.  6 patient was advised drink plenty of fluids before and after his CTA to help flush out the dye.  7.  The patients cardiac monitor results were discussed in detail.  All questions were answered.  8. The patient's blood pressure today is 142/80 mmHg. His heart rate is 72 bpm.  No medication changes were made at this time.  He was advised to monitor his blood pressure on a routine basis report any significant highs or lows.  9.  Advised the patient in detail on the benefits and risks associated with having a heart catheterization.  In case a choose to move forth with left heart catheterization.  10. The patient's wife was advised to inform Dr. Escalante's nurse practitioner that he is cleared for surgery.  Since patient's stress test is not considered high risk and there is no significant sustained arrhythmias on his monitor I do feel it is appropriate for patient to move forth with his aorta angiogram.  He would be at increased risk but standard precautions with close hemodynamic monitoring.  He will continue the aspirin.  11.  Informed of signs and symptoms of ACS and advised to seek emergent treatment for any new worsening symptoms.  Patient also advised sooner follow-up as needed.  Also advised to follow-up with family doctor as needed  This note is  dictated utilizing voice recognition software.  Although this record has been proof read, transcriptional errors may still be present. If questions occur regarding the content of this record please do not hesitate to call our office.  I have reviewed and confirmed the accuracy of the ROS as documented by the MA/LPN/RN JOHN Gonzalez      No diagnosis found.    No follow-ups on file.    There are no diagnoses linked to this encounter.    Miguel Allen  reports that he has quit smoking. His smoking use included cigarettes. He has a 10.00 pack-year smoking history. He has never used smokeless tobacco.. I have educated him on the risk of diseases from using tobacco products.     Advance Care Planning   ACP discussion was held with the patient during this visit. Patient has an advance directive (not in EMR), copy requested.          MEDS ORDERED DURING VISIT:  No orders of the defined types were placed in this encounter.          This document has been electronically signed by JOHN Gonzalez Jr.  January 17, 2023 12:46 EST

## 2023-01-20 ENCOUNTER — TELEPHONE (OUTPATIENT)
Dept: CARDIOLOGY | Facility: CLINIC | Age: 73
End: 2023-01-20

## 2023-01-20 NOTE — TELEPHONE ENCOUNTER
Caller: Lianet Allen    Relationship to patient: Emergency Contact    Best call back number: [phone]    Patient is needing: PTS WIFE CALLED BACK TO GET TESTING SCHEDULED - HUB WAS UNABLE TO WT

## 2023-01-21 LAB
BH CV ECHO MEAS - ACS: 2.5 CM
BH CV ECHO MEAS - AO MAX PG: 5.1 MMHG
BH CV ECHO MEAS - AO MEAN PG: 3.3 MMHG
BH CV ECHO MEAS - AO ROOT DIAM: 4.2 CM
BH CV ECHO MEAS - AO V2 MAX: 112.7 CM/SEC
BH CV ECHO MEAS - AO V2 VTI: 27.7 CM
BH CV ECHO MEAS - EDV(CUBED): 45.5 ML
BH CV ECHO MEAS - EDV(MOD-SP4): 102 ML
BH CV ECHO MEAS - EF(MOD-SP4): 63 %
BH CV ECHO MEAS - EF_3D-VOL: 55 %
BH CV ECHO MEAS - ESV(CUBED): 15.8 ML
BH CV ECHO MEAS - ESV(MOD-SP4): 37.7 ML
BH CV ECHO MEAS - FS: 29.7 %
BH CV ECHO MEAS - IVS/LVPW: 1.05 CM
BH CV ECHO MEAS - IVSD: 1.32 CM
BH CV ECHO MEAS - LA DIMENSION: 3.2 CM
BH CV ECHO MEAS - LAT PEAK E' VEL: 7.9 CM/SEC
BH CV ECHO MEAS - LV DIASTOLIC VOL/BSA (35-75): 50.2 CM2
BH CV ECHO MEAS - LV MASS(C)D: 156.3 GRAMS
BH CV ECHO MEAS - LV SYSTOLIC VOL/BSA (12-30): 18.6 CM2
BH CV ECHO MEAS - LVIDD: 3.6 CM
BH CV ECHO MEAS - LVIDS: 2.5 CM
BH CV ECHO MEAS - LVOT AREA: 3.5 CM2
BH CV ECHO MEAS - LVOT DIAM: 2.12 CM
BH CV ECHO MEAS - LVPWD: 1.26 CM
BH CV ECHO MEAS - MED PEAK E' VEL: 6.4 CM/SEC
BH CV ECHO MEAS - MV A MAX VEL: 101.5 CM/SEC
BH CV ECHO MEAS - MV DEC SLOPE: 284.4 CM/SEC2
BH CV ECHO MEAS - MV E MAX VEL: 64.3 CM/SEC
BH CV ECHO MEAS - MV E/A: 0.63
BH CV ECHO MEAS - RVDD: 2.7 CM
BH CV ECHO MEAS - SI(MOD-SP4): 31.7 ML/M2
BH CV ECHO MEAS - SV(MOD-SP4): 64.3 ML
BH CV ECHO MEASUREMENTS AVERAGE E/E' RATIO: 8.99
BH CV XLRA MEAS LEFT DIST CCA EDV: 15.7 CM/SEC
BH CV XLRA MEAS LEFT DIST CCA PSV: 78.6 CM/SEC
BH CV XLRA MEAS LEFT DIST ICA EDV: -27 CM/SEC
BH CV XLRA MEAS LEFT DIST ICA PSV: -92.3 CM/SEC
BH CV XLRA MEAS LEFT ICA/CCA RATIO: -1.18
BH CV XLRA MEAS LEFT MID ICA EDV: -25 CM/SEC
BH CV XLRA MEAS LEFT MID ICA PSV: -82 CM/SEC
BH CV XLRA MEAS LEFT PROX CCA EDV: 20.6 CM/SEC
BH CV XLRA MEAS LEFT PROX CCA PSV: 103.6 CM/SEC
BH CV XLRA MEAS LEFT PROX ECA EDV: -11.8 CM/SEC
BH CV XLRA MEAS LEFT PROX ECA PSV: -86.4 CM/SEC
BH CV XLRA MEAS LEFT PROX ICA EDV: 21.6 CM/SEC
BH CV XLRA MEAS LEFT PROX ICA PSV: 88.9 CM/SEC
BH CV XLRA MEAS LEFT VERTEBRAL A EDV: -8.8 CM/SEC
BH CV XLRA MEAS LEFT VERTEBRAL A PSV: -35.2 CM/SEC
BH CV XLRA MEAS RIGHT DIST CCA EDV: 14.3 CM/SEC
BH CV XLRA MEAS RIGHT DIST CCA PSV: 75.5 CM/SEC
BH CV XLRA MEAS RIGHT DIST ICA EDV: -30 CM/SEC
BH CV XLRA MEAS RIGHT DIST ICA PSV: -93.8 CM/SEC
BH CV XLRA MEAS RIGHT ICA/CCA RATIO: -1.24
BH CV XLRA MEAS RIGHT MID ICA EDV: -23.6 CM/SEC
BH CV XLRA MEAS RIGHT MID ICA PSV: -84.5 CM/SEC
BH CV XLRA MEAS RIGHT PROX CCA EDV: 14.3 CM/SEC
BH CV XLRA MEAS RIGHT PROX CCA PSV: 70.6 CM/SEC
BH CV XLRA MEAS RIGHT PROX ECA EDV: -18.1 CM/SEC
BH CV XLRA MEAS RIGHT PROX ECA PSV: -107.6 CM/SEC
BH CV XLRA MEAS RIGHT PROX ICA EDV: -17.2 CM/SEC
BH CV XLRA MEAS RIGHT PROX ICA PSV: -85.4 CM/SEC
BH CV XLRA MEAS RIGHT VERTEBRAL A EDV: 14.2 CM/SEC
BH CV XLRA MEAS RIGHT VERTEBRAL A PSV: 49.1 CM/SEC
LEFT ATRIUM VOLUME INDEX: 22.4 ML/M2
MAXIMAL PREDICTED HEART RATE: 146 BPM
MAXIMAL PREDICTED HEART RATE: 146 BPM
STRESS TARGET HR: 124 BPM
STRESS TARGET HR: 124 BPM

## 2023-01-26 ENCOUNTER — TELEPHONE (OUTPATIENT)
Dept: CARDIOLOGY | Facility: CLINIC | Age: 73
End: 2023-01-26
Payer: MEDICARE

## 2023-01-26 DIAGNOSIS — R00.2 PALPITATIONS: ICD-10-CM

## 2023-01-26 DIAGNOSIS — R07.2 CHEST PAIN, PRECORDIAL: ICD-10-CM

## 2023-01-26 DIAGNOSIS — R94.39 POSITIVE CARDIAC STRESS TEST: Primary | ICD-10-CM

## 2023-01-31 NOTE — PROGRESS NOTES
However his aortic root was dilated at 4.2 4.5 cm.  We will discuss reevaluation with CTA on next follow-up.

## 2023-02-01 ENCOUNTER — TELEPHONE (OUTPATIENT)
Dept: CARDIOLOGY | Facility: CLINIC | Age: 73
End: 2023-02-01
Payer: MEDICARE

## 2023-02-01 NOTE — TELEPHONE ENCOUNTER
----- Message from JOHN Gonzalez sent at 1/30/2023 10:47 PM EST -----  However his aortic root was dilated at 4.2 4.5 cm.  We will discuss reevaluation with CTA on next follow-up.      I called patient wife Lianet who is listed on patient hippa form and advised of Aortic root dilation of 4.2-4.5 cm. She said that CTA is already scheduled for 2/21/23. I advised that we would call her back once we receive those results.    Ute BARR

## 2023-02-01 NOTE — TELEPHONE ENCOUNTER
ECHO/US CAROTID  Pt notified of no acute findings. Provider will discuss results at f/u. Pt reminded of appt date and time.  ----- Message from Lor Santacruz MA sent at 1/31/2023  9:25 AM EST -----    ----- Message -----  From: Lm Blum APRN  Sent: 1/30/2023  10:47 PM EST  To: Lor Santacruz MA    There is no acute findings on the echocardiogram.  Keep follow-up.

## 2023-02-24 ENCOUNTER — OFFICE VISIT (OUTPATIENT)
Dept: CARDIOLOGY | Facility: CLINIC | Age: 73
End: 2023-02-24
Payer: MEDICARE

## 2023-02-24 VITALS
HEIGHT: 71 IN | WEIGHT: 183.6 LBS | HEART RATE: 67 BPM | DIASTOLIC BLOOD PRESSURE: 77 MMHG | BODY MASS INDEX: 25.7 KG/M2 | SYSTOLIC BLOOD PRESSURE: 162 MMHG | OXYGEN SATURATION: 100 %

## 2023-02-24 DIAGNOSIS — R93.89 ABNORMAL CT OF THE CHEST: ICD-10-CM

## 2023-02-24 DIAGNOSIS — R07.2 CHEST PAIN, PRECORDIAL: Primary | ICD-10-CM

## 2023-02-24 DIAGNOSIS — I10 PRIMARY HYPERTENSION: ICD-10-CM

## 2023-02-24 DIAGNOSIS — R94.39 POSITIVE CARDIAC STRESS TEST: ICD-10-CM

## 2023-02-24 PROCEDURE — 99214 OFFICE O/P EST MOD 30 MIN: CPT | Performed by: NURSE PRACTITIONER

## 2023-02-24 RX ORDER — AMLODIPINE BESYLATE 5 MG/1
5 TABLET ORAL DAILY
Qty: 30 TABLET | Refills: 6 | Status: SHIPPED | OUTPATIENT
Start: 2023-02-24 | End: 2023-03-10 | Stop reason: SDUPTHER

## 2023-02-24 RX ORDER — DOXAZOSIN 8 MG/1
8 TABLET ORAL NIGHTLY
Qty: 90 TABLET | Refills: 3 | Status: SHIPPED | OUTPATIENT
Start: 2023-02-24

## 2023-02-24 RX ORDER — ISOSORBIDE MONONITRATE 30 MG/1
30 TABLET, EXTENDED RELEASE ORAL DAILY
Qty: 30 TABLET | Refills: 11 | Status: SHIPPED | OUTPATIENT
Start: 2023-02-24 | End: 2023-03-10 | Stop reason: SDUPTHER

## 2023-02-24 RX ORDER — RANOLAZINE 500 MG/1
500 TABLET, EXTENDED RELEASE ORAL 2 TIMES DAILY
Qty: 60 TABLET | Refills: 11 | Status: SHIPPED | OUTPATIENT
Start: 2023-02-24 | End: 2023-02-24

## 2023-02-24 NOTE — PROGRESS NOTES
Subjective     Miguel Allen is a 73 y.o. male who presents to day for abnormal testing.    CHIEF COMPLIANT  Chief Complaint   Patient presents with   • abnormal testing       Active Problems:  Problem List Items Addressed This Visit    None  Visit Diagnoses     Chest pain, precordial    -  Primary    Relevant Orders    [facility]    Basic Metabolic Panel    CBC & Differential    Primary hypertension        Relevant Medications    doxazosin (CARDURA) 8 MG tablet    amLODIPine (NORVASC) 5 MG tablet    Other Relevant Orders    [facility]    Basic Metabolic Panel    CBC & Differential    Abnormal CT of the chest        Relevant Orders    [facility]    Basic Metabolic Panel    CBC & Differential    Positive cardiac stress test        Relevant Orders    [facility]    Basic Metabolic Panel    CBC & Differential      Problem list  1.  Chest pain  1.1 stress test 1/23: Inferior basilar, diaphragmatic, and inferior lateral infarct with moderate jessica-infarct ischemia in the more septal portions of the inferior wall as well as the basilar portion of the inferior lateral wall, post-rest ejection fraction 62%  2.  Palpitations  2.1 Holter monitor 12/22: PSVT with a 1% PAC burden, 1 run VT lasting 3 beats.  Bradycardic 34.5% of the time.  Symptoms occurred during sinus bradycardia to sinus tachycardia  3.  Hypertension  4.  Lower extremity edema    HPI  HPI  Mr. Miguel Allen is a 73-year-old male patient being followed up today for abnormal CTA of the heart.  Patient was referred to have a CTA of the heart after having a part of the stress test that indicated annferior basilar, diaphragmatic, and inferior lateral infarct with moderate jessica-infarct ischemia in the more septal portions of the inferior wall as well as the basilar portion of the inferior lateral wall, post-rest ejection fraction 62%.  At that time patient and wife opted to go with a less invasive approach to further  evaluate for ischemia with a CTA of the heart.    The CTA of the heart identified greater than 90% stenosis in both the left and right coronary artery.  Patient and spouse reports that he continues to have episodes of chest pain as if someone sitting on his chest.  Patient states that he has had severe episodes that have been getting more frequently.  He also has episodes where he feels them when hit him in the chest and the most pressing on his chest that well.  He denies any shortness of breath except with exertion.  States that walking up an incline can cause him to be short of breath.  Does not really seem to have any shortness of breath at rest.  He does seem to get tired easier per report.  Patient and spouse reports that the symptoms of chest pain has gotten quite a bit more frequent and worse.  In addition to this he did have 1 run of nonsustained ventricular tachycardia only lasting 3 beats.     Denies any syncope.  Dizziness happens if he gets up too quickly.  Patient does have Lewy body dementia and the majority of the information was obtained from his wife.  We did discuss CTA in detail and patient and spouse verbalized understanding.    PRIOR MEDS  Current Outpatient Medications on File Prior to Visit   Medication Sig Dispense Refill   • aspirin 81 MG EC tablet Take 81 mg by mouth Daily.     • busPIRone (BUSPAR) 5 MG tablet Take 5 mg by mouth Daily.     • cetirizine (zyrTEC) 10 MG tablet Take 10 mg by mouth Daily.     • Cinnamon 500 MG tablet Take 500 mg by mouth Daily. 2 tabs po qd     • DICLOFENAC PO Take 50 mg by mouth 2 (Two) Times a Day.     • donepezil (ARICEPT) 5 MG tablet Take 5 mg by mouth Every Night.     • finasteride (PROSCAR) 5 MG tablet Take 5 mg by mouth Daily.     • furosemide (LASIX) 20 MG tablet Take 20 mg by mouth Daily.     • lisinopril (PRINIVIL,ZESTRIL) 40 MG tablet Take 40 mg by mouth Daily.     • metoprolol succinate XL (TOPROL-XL) 25 MG 24 hr tablet Take 25 mg by mouth Daily.      • mirtazapine (REMERON) 15 MG tablet Take 15 mg by mouth Every Night.     • nitroglycerin (NITROSTAT) 0.4 MG SL tablet 1 under the tongue as needed for angina, may repeat q5mins for up three doses 30 tablet 5   • Omega-3 Fatty Acids (FISH OIL PO) Take 1,400 mg by mouth Daily.     • potassium chloride 10 MEQ CR tablet Take 10 mEq by mouth 2 (Two) Times a Day.     • Saw Palmetto 450 MG capsule Take  by mouth.     • Turmeric (QC TUMERIC COMPLEX PO) Take 500 mg by mouth Daily.     • VITAMIN D, CHOLECALCIFEROL, PO Take 125 mcg by mouth Daily.     • [DISCONTINUED] doxazosin (CARDURA) 8 MG tablet Take 1 tablet by mouth Every Night. 90 tablet 3   • [DISCONTINUED] metoprolol tartrate (LOPRESSOR) 100 MG tablet 100mg at 7am morning of CTA and 100mg to be taken to CTA and only take if instructed 2 tablet 0     No current facility-administered medications on file prior to visit.       ALLERGIES  Patient has no known allergies.    HISTORY  Past Medical History:   Diagnosis Date   • Aneurysm (HCC)    • Arthritis    • Colon polyps    • Gall stones    • GERD (gastroesophageal reflux disease)    • Hyperlipidemia    • Hypertension    • Kidney stones    • Lewy body dementia (HCC)    • Parkinson's disease (HCC)    • Stroke (HCC)        Social History     Socioeconomic History   • Marital status:    Tobacco Use   • Smoking status: Former     Packs/day: 1.00     Years: 10.00     Pack years: 10.00     Types: Cigarettes   • Smokeless tobacco: Never   Vaping Use   • Vaping Use: Never used   Substance and Sexual Activity   • Alcohol use: Never   • Drug use: Never   • Sexual activity: Defer       Family History   Problem Relation Age of Onset   • Dementia Mother    • Hypertension Father    • Diabetes Father        Review of Systems   Constitutional: Positive for fatigue. Negative for chills and fever.   HENT: Negative for congestion, rhinorrhea and sore throat.    Respiratory: Positive for chest tightness and shortness of breath.   "  Cardiovascular: Positive for leg swelling. Negative for chest pain and palpitations.   Gastrointestinal: Positive for constipation. Negative for diarrhea and nausea.   Musculoskeletal: Positive for arthralgias, back pain and neck pain.   Allergic/Immunologic: Negative for environmental allergies and food allergies.   Neurological: Positive for dizziness (getting up quickly), weakness and light-headedness. Negative for syncope.   Hematological: Does not bruise/bleed easily.   Psychiatric/Behavioral: Negative for sleep disturbance.       Objective     VITALS: /77 (BP Location: Left arm, Patient Position: Sitting, Cuff Size: Adult)   Pulse 67   Ht 180.3 cm (70.98\")   Wt 83.3 kg (183 lb 9.6 oz)   SpO2 100%   BMI 25.62 kg/m²     LABS:   Lab Results (most recent)     None          IMAGING:   No Images in the past 120 days found..    EXAM:  Physical Exam  Vitals and nursing note reviewed.   Constitutional:       Appearance: He is well-developed.   HENT:      Head: Normocephalic.   Eyes:      Pupils: Pupils are equal, round, and reactive to light.   Neck:      Thyroid: No thyroid mass.      Vascular: No carotid bruit or JVD.      Trachea: Trachea and phonation normal.   Cardiovascular:      Rate and Rhythm: Normal rate and regular rhythm.      Pulses:           Radial pulses are 2+ on the right side and 2+ on the left side.        Posterior tibial pulses are 2+ on the right side and 2+ on the left side.      Heart sounds: No murmur heard.    No friction rub. Gallop present.   Pulmonary:      Effort: Pulmonary effort is normal. No respiratory distress.      Breath sounds: Normal breath sounds. No wheezing or rales.   Abdominal:      General: Bowel sounds are normal.      Palpations: Abdomen is soft.   Musculoskeletal:         General: Swelling (trace) present. Normal range of motion.      Cervical back: Neck supple.   Skin:     General: Skin is warm and dry.      Capillary Refill: Capillary refill takes less " than 2 seconds.      Findings: No rash.   Neurological:      Mental Status: He is alert. Mental status is at baseline.   Psychiatric:         Speech: Speech normal.         Procedure   Procedures       Assessment & Plan    Diagnosis Plan   1. Chest pain, precordial  [facility]    Basic Metabolic Panel    CBC & Differential      2. Primary hypertension  doxazosin (CARDURA) 8 MG tablet    [facility]    Basic Metabolic Panel    CBC & Differential      3. Abnormal CT of the chest  [facility]    Basic Metabolic Panel    CBC & Differential      4. Positive cardiac stress test  [facility]    Basic Metabolic Panel    CBC & Differential      1.  Due to patient's CTA that was abnormal indicating three-vessel disease as well as positive stress test and chest pain we did have an extensive conversation with the patient and spouse about recommendations of left heart catheterization.  Patient has opted to move forth with a left heart catheterization.  Benefits and risk of the heart catheterization was explained and patient and spouse wishes to continue.    We will also have patient have labs drawn proximately 1 week prior to left heart catheterization for further risk stratification.  I did speak with  and he will perform the left heart catheterization on Monday morning.  2.  Due to patient's likelihood of having three-vessel disease we will intensify his antianginal therapy.  We will place him on isosorbide 30 mg daily as well as amlodipine 5 mg daily.  We did discuss titrating beta-blocker therapy however patient was bradycardic greater than 30% of the time on his cardiac event monitor.  We will continue the metoprolol at current dosing.  3.  Patient's blood pressure is significantly elevated today.  As mentioned above we will be adding isosorbide and amlodipine which will hopefully help better control his blood pressure.  Spouse reports that his blood pressure is usually a little  high.  We will reevaluate his blood pressure on follow-up from left heart catheterization and further titrations will be made at that time.  4.  Informed of signs and symptoms of ACS and advised to seek emergent treatment for any new worsening symptoms.  Patient also advised sooner follow-up as needed.  Also advised to follow-up with family doctor as needed  This note is dictated utilizing voice recognition software.  Although this record has been proof read, transcriptional errors may still be present. If questions occur regarding the content of this record please do not hesitate to call our office.  I have reviewed and confirmed the accuracy of the ROS as documented by the MA/LPN/RN JOHN Gonzalez    Return for cath follow up 1-2 weeks.    Diagnoses and all orders for this visit:    1. Chest pain, precordial (Primary)  -     Discontinue: ranolazine (Ranexa) 500 MG 12 hr tablet; Take 1 tablet by mouth 2 (Two) Times a Day.  Dispense: 60 tablet; Refill: 11  -     [facility]; Future  -     Basic Metabolic Panel; Future  -     CBC & Differential; Future    2. Primary hypertension  -     doxazosin (CARDURA) 8 MG tablet; Take 1 tablet by mouth Every Night.  Dispense: 90 tablet; Refill: 3  -     [facility]; Future  -     Basic Metabolic Panel; Future  -     CBC & Differential; Future    3. Abnormal CT of the chest  -     [facility]; Future  -     Basic Metabolic Panel; Future  -     CBC & Differential; Future    4. Positive cardiac stress test  -     [facility]; Future  -     Basic Metabolic Panel; Future  -     CBC & Differential; Future    Other orders  -     isosorbide mononitrate (IMDUR) 30 MG 24 hr tablet; Take 1 tablet by mouth Daily.  Dispense: 30 tablet; Refill: 11  -     amLODIPine (NORVASC) 5 MG tablet; Take 1 tablet by mouth Daily.  Dispense: 30 tablet; Refill: 6        Miguel Allen  reports that he has quit smoking. His smoking use included cigarettes. He  has a 10.00 pack-year smoking history. He has never used smokeless tobacco.. I have educated him on the risk of diseases from using tobacco products.     Advance Care Planning   ACP discussion was held with the patient during this visit. Patient has an advance directive (not in EMR), copy requested.          MEDS ORDERED DURING VISIT:  New Medications Ordered This Visit   Medications   • isosorbide mononitrate (IMDUR) 30 MG 24 hr tablet     Sig: Take 1 tablet by mouth Daily.     Dispense:  30 tablet     Refill:  11   • doxazosin (CARDURA) 8 MG tablet     Sig: Take 1 tablet by mouth Every Night.     Dispense:  90 tablet     Refill:  3   • amLODIPine (NORVASC) 5 MG tablet     Sig: Take 1 tablet by mouth Daily.     Dispense:  30 tablet     Refill:  6           This document has been electronically signed by Lm Blum Jr., APRMARIE  February 24, 2023 21:32 EST

## 2023-02-28 DIAGNOSIS — R00.2 PALPITATIONS: ICD-10-CM

## 2023-02-28 DIAGNOSIS — R94.39 POSITIVE CARDIAC STRESS TEST: ICD-10-CM

## 2023-02-28 DIAGNOSIS — R07.2 CHEST PAIN, PRECORDIAL: ICD-10-CM

## 2023-02-28 DIAGNOSIS — R42 DIZZY: ICD-10-CM

## 2023-02-28 DIAGNOSIS — I10 PRIMARY HYPERTENSION: ICD-10-CM

## 2023-03-02 ENCOUNTER — TELEPHONE (OUTPATIENT)
Dept: CARDIOLOGY | Facility: CLINIC | Age: 73
End: 2023-03-02
Payer: MEDICARE

## 2023-03-02 NOTE — TELEPHONE ENCOUNTER
----- Message from JOHN Gonzalez sent at 3/1/2023 10:10 PM EST -----  Mildly elevated creatinine at 1.14 with a normal GFR.  Keep follow-up.    I called and left a message for patient advised of mildly elevated creatinine at 1.14 normal GFR. Keep follow up .    Ute BARR

## 2023-03-10 ENCOUNTER — OFFICE VISIT (OUTPATIENT)
Dept: CARDIOLOGY | Facility: CLINIC | Age: 73
End: 2023-03-10
Payer: MEDICARE

## 2023-03-10 VITALS
HEART RATE: 73 BPM | DIASTOLIC BLOOD PRESSURE: 79 MMHG | WEIGHT: 186.2 LBS | OXYGEN SATURATION: 98 % | HEIGHT: 71 IN | BODY MASS INDEX: 26.07 KG/M2 | SYSTOLIC BLOOD PRESSURE: 140 MMHG

## 2023-03-10 DIAGNOSIS — R93.89 ABNORMAL CT OF THE CHEST: ICD-10-CM

## 2023-03-10 DIAGNOSIS — I10 PRIMARY HYPERTENSION: ICD-10-CM

## 2023-03-10 DIAGNOSIS — R00.2 PALPITATIONS: Primary | ICD-10-CM

## 2023-03-10 DIAGNOSIS — I25.10 3-VESSEL CAD: ICD-10-CM

## 2023-03-10 DIAGNOSIS — R07.2 CHEST PAIN, PRECORDIAL: ICD-10-CM

## 2023-03-10 PROCEDURE — 99214 OFFICE O/P EST MOD 30 MIN: CPT | Performed by: NURSE PRACTITIONER

## 2023-03-10 RX ORDER — ISOSORBIDE MONONITRATE 30 MG/1
30 TABLET, EXTENDED RELEASE ORAL DAILY
Qty: 90 TABLET | Refills: 3 | Status: SHIPPED | OUTPATIENT
Start: 2023-03-10

## 2023-03-10 RX ORDER — CLOPIDOGREL BISULFATE 75 MG/1
75 TABLET ORAL DAILY
Qty: 90 TABLET | Refills: 3 | Status: SHIPPED | OUTPATIENT
Start: 2023-03-10

## 2023-03-10 RX ORDER — AMLODIPINE BESYLATE 5 MG/1
5 TABLET ORAL DAILY
Qty: 90 TABLET | Refills: 3 | Status: SHIPPED | OUTPATIENT
Start: 2023-03-10

## 2023-03-10 RX ORDER — ROSUVASTATIN CALCIUM 40 MG/1
40 TABLET, COATED ORAL DAILY
Qty: 90 TABLET | Refills: 3 | Status: SHIPPED | OUTPATIENT
Start: 2023-03-10

## 2023-03-10 NOTE — PROGRESS NOTES
Subjective     Miguel Allen is a 73 y.o. male who presents to [facility] for cath follow up  and Cardiac Valve Problem.    CHIEF COMPLIANT  Chief Complaint   Patient presents with   • cath follow up    • Cardiac Valve Problem       Active Problems:  Problem List Items Addressed This Visit    None  Visit Diagnoses     Palpitations    -  Primary    Relevant Medications    amLODIPine (NORVASC) 5 MG tablet    isosorbide mononitrate (IMDUR) 30 MG 24 hr tablet    rosuvastatin (CRESTOR) 40 MG tablet    clopidogrel (PLAVIX) 75 MG tablet    Other Relevant Orders    [facility]    Basic Metabolic Panel    CBC & Differential    Chest pain, precordial        Relevant Medications    amLODIPine (NORVASC) 5 MG tablet    isosorbide mononitrate (IMDUR) 30 MG 24 hr tablet    rosuvastatin (CRESTOR) 40 MG tablet    clopidogrel (PLAVIX) 75 MG tablet    Other Relevant Orders    [facility]    Basic Metabolic Panel    CBC & Differential    Abnormal CT of the chest        Relevant Medications    amLODIPine (NORVASC) 5 MG tablet    isosorbide mononitrate (IMDUR) 30 MG 24 hr tablet    rosuvastatin (CRESTOR) 40 MG tablet    clopidogrel (PLAVIX) 75 MG tablet    Other Relevant Orders    [facility]    Basic Metabolic Panel    CBC & Differential    3-vessel CAD        Relevant Medications    amLODIPine (NORVASC) 5 MG tablet    isosorbide mononitrate (IMDUR) 30 MG 24 hr tablet    rosuvastatin (CRESTOR) 40 MG tablet    clopidogrel (PLAVIX) 75 MG tablet    Other Relevant Orders    [facility]    Basic Metabolic Panel    CBC & Differential    Primary hypertension        Relevant Medications    amLODIPine (NORVASC) 5 MG tablet    isosorbide mononitrate (IMDUR) 30 MG 24 hr tablet    rosuvastatin (CRESTOR) 40 MG tablet    clopidogrel (PLAVIX) 75 MG tablet    Other Relevant Orders    [facility]    Basic Metabolic Panel    CBC & Differential           Problem list  1.  Chest pain  1.1 left heart cath 2/23:  Left main Short normal, LAD heavily calcified 40 to 50% proximal 70 to 80% mid, 50 to 60% apical, diffuse disease D1 and D2.  Left to right collaterals seen filling the distal right PDA and PLV.  Circumflex proximal 70% stenosis OM1 60 to 70%, %  2.  Palpitations  2.1 Holter monitor 12/22: PSVT with a 1% PAC burden, 1 run VT lasting 3 beats.  Bradycardic 34.5% of the time.  Symptoms occurred during sinus bradycardia to sinus tachycardia  3.  Hypertension  4.  Lower extremity edema      HPI  HPI     Miguel Allen is a 73-year-old male who is being followed up today status post left heart catheterization. Patient did have a left heart catheterization that identified three vessel disease. It was recommended that he be referred for evaluation for bypass surgery by Dr. Martin. Left heart catheterization revealed 90 percent blockage in the LAD. Diffuse disease. Left anterior descending artery was heavily calcified and has 40 to 50 percent stenosis in the proximal, 70 to 80 percent stenosis in the mid and 50 to 60 percent stenosis. Right coronary artery was completely occluded. He is accompanied by an adult female who is his wife.   He is on aspirin for antiplatelet therapy, metoprolol for beta-blocker therapy, isosorbide and amlodipine for antianginal therapy.    We did discuss in detail the significance of his left heart catheterization.  Finding multivessel disease with a chronically totally occluded RCA with left-to-right collaterals from the LAD.  Did discuss coronary artery bypass grafting versus percutaneous coronary artery intervention with stent placement.  This was discussed in detail and all questions were answered. The adult female states the patient is not capable of undergoing bypass surgery.  She does not feel as if coronary artery bypass grafting is an option and wants to move forth with stenting.  She states the patient has difficulty ambulating and he would have a extremely difficult time  recovering.  He also has a history of dementia and Parkinson's..     Also, she states the patient experiences angina with exertion.  The chest pain feels as if someone sitting on his chest.  Patient states that he has had severe episodes that have been getting more frequently prior to initiation of isosorbide.  He also has episodes where he feels them when hit him in the chest and the most pressing on his chest that well.  He denies any shortness of breath except with exertion.  States that walking up an incline can cause him to be short of breath.  Does not really seem to have any shortness of breath at rest.  He does seem to get tired easier per report.  In addition to this he did have 1 run of nonsustained ventricular tachycardia only lasting 3 beats.     She did not give the patient his dieuretic this morning, and his legs were not experiencing a lot of edema.  She is afraid where they were traveling to the doctor and everything today that this would make him more uncomfortable and she will continue the Lasix tomorrow.    He also has chronic arterial hypertension he is currently on doxazosin, lisinopril, metoprolol, and amlodipine.  Today's blood pressure is 140/79.  The adult female states the patient has Parkinson's and dementia.    PRIOR MEDS  Current Outpatient Medications on File Prior to Visit   Medication Sig Dispense Refill   • aspirin 81 MG EC tablet Take 1 tablet by mouth Daily.     • busPIRone (BUSPAR) 5 MG tablet Take 1 tablet by mouth Daily.     • cetirizine (zyrTEC) 10 MG tablet Take 1 tablet by mouth Daily.     • Cinnamon 500 MG tablet Take 500 mg by mouth Daily. 2 tabs po qd     • DICLOFENAC PO Take 50 mg by mouth 2 (Two) Times a Day.     • donepezil (ARICEPT) 5 MG tablet Take 1 tablet by mouth Every Night.     • doxazosin (CARDURA) 8 MG tablet Take 1 tablet by mouth Every Night. 90 tablet 3   • finasteride (PROSCAR) 5 MG tablet Take 1 tablet by mouth Daily.     • furosemide (LASIX) 20 MG tablet  Take 1 tablet by mouth Daily.     • lisinopril (PRINIVIL,ZESTRIL) 40 MG tablet Take 1 tablet by mouth Daily.     • metoprolol succinate XL (TOPROL-XL) 25 MG 24 hr tablet Take 1 tablet by mouth Daily.     • mirtazapine (REMERON) 15 MG tablet Take 1 tablet by mouth Every Night.     • nitroglycerin (NITROSTAT) 0.4 MG SL tablet 1 under the tongue as needed for angina, may repeat q5mins for up three doses 30 tablet 5   • Omega-3 Fatty Acids (FISH OIL PO) Take 1,400 mg by mouth Daily.     • potassium chloride 10 MEQ CR tablet Take 1 tablet by mouth 2 (Two) Times a Day.     • Saw Palmetto 450 MG capsule Take  by mouth.     • Turmeric (QC TUMERIC COMPLEX PO) Take 500 mg by mouth Daily.     • VITAMIN D, CHOLECALCIFEROL, PO Take 125 mcg by mouth Daily.     • [DISCONTINUED] amLODIPine (NORVASC) 5 MG tablet Take 1 tablet by mouth Daily. 30 tablet 6   • [DISCONTINUED] isosorbide mononitrate (IMDUR) 30 MG 24 hr tablet Take 1 tablet by mouth Daily. 30 tablet 11     No current facility-administered medications on file prior to visit.       ALLERGIES  Patient has no known allergies.    HISTORY  Past Medical History:   Diagnosis Date   • Aneurysm (HCC)    • Arthritis    • Colon polyps    • Gall stones    • GERD (gastroesophageal reflux disease)    • Hyperlipidemia    • Hypertension    • Kidney stones    • Lewy body dementia (HCC)    • Parkinson's disease (HCC)    • Stroke (HCC)        Social History     Socioeconomic History   • Marital status:    Tobacco Use   • Smoking status: Former     Packs/day: 1.00     Years: 10.00     Pack years: 10.00     Types: Cigarettes   • Smokeless tobacco: Never   Vaping Use   • Vaping Use: Never used   Substance and Sexual Activity   • Alcohol use: Never   • Drug use: Never   • Sexual activity: Defer       Family History   Problem Relation Age of Onset   • Dementia Mother    • Hypertension Father    • Diabetes Father        Review of Systems   Constitutional: Negative for chills, fatigue and  "fever.   HENT: Negative for congestion, rhinorrhea and sore throat.    Respiratory: Negative for chest tightness and shortness of breath.    Cardiovascular: Positive for leg swelling (in mornings). Negative for chest pain and palpitations.   Gastrointestinal: Positive for constipation. Negative for diarrhea and nausea.   Musculoskeletal: Positive for arthralgias, back pain and neck pain.   Allergic/Immunologic: Positive for environmental allergies. Negative for food allergies.   Neurological: Positive for weakness. Negative for dizziness, syncope and light-headedness.   Hematological: Does not bruise/bleed easily.   Psychiatric/Behavioral: Positive for sleep disturbance (Sun downers).       Objective     VITALS: /79 (BP Location: Left arm, Patient Position: Sitting, Cuff Size: Adult)   Pulse 73   Ht 180.3 cm (70.98\")   Wt 84.5 kg (186 lb 3.2 oz)   SpO2 98%   BMI 25.98 kg/m²     LABS:   Lab Results (most recent)     None          IMAGING:   No Images in the past 120 days found..    EXAM:  Physical Exam  Vitals and nursing note reviewed.   Constitutional:       Appearance: He is well-developed.   HENT:      Head: Normocephalic and atraumatic.   Eyes:      Pupils: Pupils are equal, round, and reactive to light.   Neck:      Vascular: No carotid bruit or JVD.   Cardiovascular:      Rate and Rhythm: Normal rate and regular rhythm.      Pulses:           Carotid pulses are 2+ on the right side and 2+ on the left side.       Radial pulses are 2+ on the right side and 2+ on the left side.        Posterior tibial pulses are 2+ on the right side and 2+ on the left side.      Heart sounds: No murmur heard.    Gallop present.   Pulmonary:      Effort: Pulmonary effort is normal. No respiratory distress.      Breath sounds: Normal breath sounds.   Abdominal:      General: Bowel sounds are normal. There is no distension.      Palpations: Abdomen is soft.      Tenderness: There is no abdominal tenderness. "   Musculoskeletal:         General: Swelling (trace) present. Normal range of motion.      Cervical back: Neck supple.   Skin:     General: Skin is warm and dry.          Neurological:      Mental Status: He is alert and oriented to person, place, and time.      Cranial Nerves: No cranial nerve deficit.      Sensory: No sensory deficit.      Gait: Gait abnormal.   Psychiatric:         Speech: Speech normal.         Behavior: Behavior normal.         Thought Content: Thought content normal.         Judgment: Judgment normal.         Procedure   Procedures       Assessment & Plan    Diagnosis Plan   1. Palpitations  amLODIPine (NORVASC) 5 MG tablet    isosorbide mononitrate (IMDUR) 30 MG 24 hr tablet    [facility]    Basic Metabolic Panel    CBC & Differential    rosuvastatin (CRESTOR) 40 MG tablet    clopidogrel (PLAVIX) 75 MG tablet      2. Chest pain, precordial  amLODIPine (NORVASC) 5 MG tablet    isosorbide mononitrate (IMDUR) 30 MG 24 hr tablet    [facility]    Basic Metabolic Panel    CBC & Differential    rosuvastatin (CRESTOR) 40 MG tablet    clopidogrel (PLAVIX) 75 MG tablet      3. Abnormal CT of the chest  amLODIPine (NORVASC) 5 MG tablet    isosorbide mononitrate (IMDUR) 30 MG 24 hr tablet    [facility]    Basic Metabolic Panel    CBC & Differential    rosuvastatin (CRESTOR) 40 MG tablet    clopidogrel (PLAVIX) 75 MG tablet      4. 3-vessel CAD  amLODIPine (NORVASC) 5 MG tablet    isosorbide mononitrate (IMDUR) 30 MG 24 hr tablet    [facility]    Basic Metabolic Panel    CBC & Differential    rosuvastatin (CRESTOR) 40 MG tablet    clopidogrel (PLAVIX) 75 MG tablet      5. Primary hypertension  amLODIPine (NORVASC) 5 MG tablet    isosorbide mononitrate (IMDUR) 30 MG 24 hr tablet    [facility]    Basic Metabolic Panel    CBC & Differential    rosuvastatin (CRESTOR) 40 MG tablet    clopidogrel (PLAVIX) 75 MG tablet        1. The patient's cardiac testing  results were discussed in full detail with the patient today.  Right radial cath site is without hematoma or exudate.  Right radial pulse is palpable proximal and distal to the insertion site.  Patient denies any loss of sensation, numbness, or tingling.  Capillary refill within 2 seconds.  2. The patient's cardiac catheterization results were discussed in full length with the patient as well as possible treatment options.   3. The patient and the female present both agree that the patient would not handle bypass surgery well, and it would not be a consideration for them due to this the patient will be referred to Dr. Pickering for further evaluation of his LAD/diagonal and circumflex for possible coronary artery intervention.  Patient has opted to move forth with a left heart catheterization.  Benefits and risk of the heart catheterization was explained and patient wishes to continue.    We will also have patient have labs drawn proximately 1 week prior to left heart catheterization for further risk stratification  4. The patient was encouraged to monitor his blood pressure on a daily basis and report any significant highs or lows.  His blood pressure slightly elevated today in which we we will continue to monitor at this time.  He will continue his antianginal therapy as prescribed.  However I will send in rosuvastatin and Plavix for him to initiate  5.  Informed of signs and symptoms of ACS and advised to seek emergent treatment for any new worsening symptoms.  Patient also advised sooner follow-up as needed.  Also advised to follow-up with family doctor as needed  This note is dictated utilizing voice recognition software.  Although this record has been proof read, transcriptional errors may still be present. If questions occur regarding the content of this record please do not hesitate to call our office.  I have reviewed and confirmed the accuracy of the ROS as documented by the MA/LPN/RN Lm Blum,  APRN    Return if symptoms worsen or fail to improve, for cath follow up 1-2 weeks.    Diagnoses and all orders for this visit:    1. Palpitations (Primary)  -     amLODIPine (NORVASC) 5 MG tablet; Take 1 tablet by mouth Daily.  Dispense: 90 tablet; Refill: 3  -     isosorbide mononitrate (IMDUR) 30 MG 24 hr tablet; Take 1 tablet by mouth Daily.  Dispense: 90 tablet; Refill: 3  -     [facility]; Future  -     Basic Metabolic Panel; Future  -     CBC & Differential; Future  -     rosuvastatin (CRESTOR) 40 MG tablet; Take 1 tablet by mouth Daily.  Dispense: 90 tablet; Refill: 3  -     clopidogrel (PLAVIX) 75 MG tablet; Take 1 tablet by mouth Daily.  Dispense: 90 tablet; Refill: 3    2. Chest pain, precordial  -     amLODIPine (NORVASC) 5 MG tablet; Take 1 tablet by mouth Daily.  Dispense: 90 tablet; Refill: 3  -     isosorbide mononitrate (IMDUR) 30 MG 24 hr tablet; Take 1 tablet by mouth Daily.  Dispense: 90 tablet; Refill: 3  -     [place] [facility]; Future  -     Basic Metabolic Panel; Future  -     CBC & Differential; Future  -     rosuvastatin (CRESTOR) 40 MG tablet; Take 1 tablet by mouth Daily.  Dispense: 90 tablet; Refill: 3  -     clopidogrel (PLAVIX) 75 MG tablet; Take 1 tablet by mouth Daily.  Dispense: 90 tablet; Refill: 3    3. Abnormal CT of the chest  -     amLODIPine (NORVASC) 5 MG tablet; Take 1 tablet by mouth Daily.  Dispense: 90 tablet; Refill: 3  -     isosorbide mononitrate (IMDUR) 30 MG 24 hr tablet; Take 1 tablet by mouth Daily.  Dispense: 90 tablet; Refill: 3  -     [facility]; Future  -     Basic Metabolic Panel; Future  -     CBC & Differential; Future  -     rosuvastatin (CRESTOR) 40 MG tablet; Take 1 tablet by mouth Daily.  Dispense: 90 tablet; Refill: 3  -     clopidogrel (PLAVIX) 75 MG tablet; Take 1 tablet by mouth Daily.  Dispense: 90 tablet; Refill: 3    4. 3-vessel CAD  -     amLODIPine (NORVASC) 5 MG tablet; Take 1 tablet by mouth Daily.  Dispense: 90 tablet;  Refill: 3  -     isosorbide mononitrate (IMDUR) 30 MG 24 hr tablet; Take 1 tablet by mouth Daily.  Dispense: 90 tablet; Refill: 3  -     [place] [facility]; Future  -     Basic Metabolic Panel; Future  -     CBC & Differential; Future  -     rosuvastatin (CRESTOR) 40 MG tablet; Take 1 tablet by mouth Daily.  Dispense: 90 tablet; Refill: 3  -     clopidogrel (PLAVIX) 75 MG tablet; Take 1 tablet by mouth Daily.  Dispense: 90 tablet; Refill: 3    5. Primary hypertension  -     amLODIPine (NORVASC) 5 MG tablet; Take 1 tablet by mouth Daily.  Dispense: 90 tablet; Refill: 3  -     isosorbide mononitrate (IMDUR) 30 MG 24 hr tablet; Take 1 tablet by mouth Daily.  Dispense: 90 tablet; Refill: 3  -     [place] [facility]; Future  -     Basic Metabolic Panel; Future  -     CBC & Differential; Future  -     rosuvastatin (CRESTOR) 40 MG tablet; Take 1 tablet by mouth Daily.  Dispense: 90 tablet; Refill: 3  -     clopidogrel (PLAVIX) 75 MG tablet; Take 1 tablet by mouth Daily.  Dispense: 90 tablet; Refill: 3        Miguel Allen  reports that he has quit smoking. His smoking use included cigarettes. He has a 10.00 pack-year smoking history. He has never used smokeless tobacco.. I have educated him on the risk of diseases from using tobacco products .    Advance Care Planning   ACP discussion was held with the patient during this visit. Patient has an advance directive (not in EMR), copy requested.            MEDS ORDERED DURING VISIT:  New Medications Ordered This Visit   Medications   • amLODIPine (NORVASC) 5 MG tablet     Sig: Take 1 tablet by mouth Daily.     Dispense:  90 tablet     Refill:  3   • isosorbide mononitrate (IMDUR) 30 MG 24 hr tablet     Sig: Take 1 tablet by mouth Daily.     Dispense:  90 tablet     Refill:  3   • rosuvastatin (CRESTOR) 40 MG tablet     Sig: Take 1 tablet by mouth Daily.     Dispense:  90 tablet     Refill:  3   • clopidogrel (PLAVIX) 75 MG tablet     Sig: Take 1 tablet by mouth Daily.      Dispense:  90 tablet     Refill:  3           This document has been electronically signed by JOHN Gonzalez Jr.  March 10, 2023 16:30 EST      Transcribed from ambient dictation for JOHN Gonzalez by Adry [employer] .  03/10/23   12:10 EST    Patient or patient representative verbalized consent to the visit recording.  I have personally performed the services described in this document as transcribed by the above individual, and it is both accurate and complete.

## 2023-03-15 ENCOUNTER — LAB (OUTPATIENT)
Dept: LAB | Facility: HOSPITAL | Age: 73
End: 2023-03-15
Payer: MEDICARE

## 2023-03-15 DIAGNOSIS — I10 PRIMARY HYPERTENSION: ICD-10-CM

## 2023-03-15 DIAGNOSIS — R93.89 ABNORMAL CT OF THE CHEST: ICD-10-CM

## 2023-03-15 DIAGNOSIS — R07.2 CHEST PAIN, PRECORDIAL: ICD-10-CM

## 2023-03-15 DIAGNOSIS — R00.2 PALPITATIONS: ICD-10-CM

## 2023-03-15 DIAGNOSIS — I25.10 3-VESSEL CAD: ICD-10-CM

## 2023-03-15 LAB
ANION GAP SERPL CALCULATED.3IONS-SCNC: 11.5 MMOL/L (ref 5–15)
BASOPHILS # BLD AUTO: 0.04 10*3/MM3 (ref 0–0.2)
BASOPHILS NFR BLD AUTO: 0.6 % (ref 0–1.5)
BUN SERPL-MCNC: 19 MG/DL (ref 8–23)
BUN/CREAT SERPL: 16 (ref 7–25)
CALCIUM SPEC-SCNC: 9.4 MG/DL (ref 8.6–10.5)
CHLORIDE SERPL-SCNC: 101 MMOL/L (ref 98–107)
CO2 SERPL-SCNC: 24.5 MMOL/L (ref 22–29)
CREAT SERPL-MCNC: 1.19 MG/DL (ref 0.76–1.27)
DEPRECATED RDW RBC AUTO: 43.9 FL (ref 37–54)
EGFRCR SERPLBLD CKD-EPI 2021: 63.7 ML/MIN/1.73
EOSINOPHIL # BLD AUTO: 0.23 10*3/MM3 (ref 0–0.4)
EOSINOPHIL NFR BLD AUTO: 3.2 % (ref 0.3–6.2)
ERYTHROCYTE [DISTWIDTH] IN BLOOD BY AUTOMATED COUNT: 13.4 % (ref 12.3–15.4)
GLUCOSE SERPL-MCNC: 114 MG/DL (ref 65–99)
HCT VFR BLD AUTO: 41.9 % (ref 37.5–51)
HGB BLD-MCNC: 13.6 G/DL (ref 13–17.7)
IMM GRANULOCYTES # BLD AUTO: 0.03 10*3/MM3 (ref 0–0.05)
IMM GRANULOCYTES NFR BLD AUTO: 0.4 % (ref 0–0.5)
LYMPHOCYTES # BLD AUTO: 1.56 10*3/MM3 (ref 0.7–3.1)
LYMPHOCYTES NFR BLD AUTO: 21.5 % (ref 19.6–45.3)
MCH RBC QN AUTO: 28.9 PG (ref 26.6–33)
MCHC RBC AUTO-ENTMCNC: 32.5 G/DL (ref 31.5–35.7)
MCV RBC AUTO: 89.1 FL (ref 79–97)
MONOCYTES # BLD AUTO: 0.72 10*3/MM3 (ref 0.1–0.9)
MONOCYTES NFR BLD AUTO: 9.9 % (ref 5–12)
NEUTROPHILS NFR BLD AUTO: 4.68 10*3/MM3 (ref 1.7–7)
NEUTROPHILS NFR BLD AUTO: 64.4 % (ref 42.7–76)
NRBC BLD AUTO-RTO: 0 /100 WBC (ref 0–0.2)
PLATELET # BLD AUTO: 180 10*3/MM3 (ref 140–450)
PMV BLD AUTO: 10 FL (ref 6–12)
POTASSIUM SERPL-SCNC: 4.7 MMOL/L (ref 3.5–5.2)
RBC # BLD AUTO: 4.7 10*6/MM3 (ref 4.14–5.8)
SODIUM SERPL-SCNC: 137 MMOL/L (ref 136–145)
WBC NRBC COR # BLD: 7.26 10*3/MM3 (ref 3.4–10.8)

## 2023-03-15 PROCEDURE — 80048 BASIC METABOLIC PNL TOTAL CA: CPT | Performed by: NURSE PRACTITIONER

## 2023-03-15 PROCEDURE — 85025 COMPLETE CBC W/AUTO DIFF WBC: CPT | Performed by: NURSE PRACTITIONER

## 2023-03-15 NOTE — PROGRESS NOTES
May proceed with left heart cath.  BMP relatively normal except for elevated glucose at 114, CBC within normal range.

## 2023-03-21 ENCOUNTER — OUTSIDE FACILITY SERVICE (OUTPATIENT)
Dept: CARDIOLOGY | Facility: CLINIC | Age: 73
End: 2023-03-21
Payer: MEDICARE

## 2023-03-24 ENCOUNTER — OFFICE VISIT (OUTPATIENT)
Dept: CARDIOLOGY | Facility: CLINIC | Age: 73
End: 2023-03-24
Payer: MEDICARE

## 2023-03-24 VITALS
OXYGEN SATURATION: 96 % | BODY MASS INDEX: 26.07 KG/M2 | WEIGHT: 186.2 LBS | DIASTOLIC BLOOD PRESSURE: 65 MMHG | SYSTOLIC BLOOD PRESSURE: 116 MMHG | HEIGHT: 71 IN | HEART RATE: 62 BPM

## 2023-03-24 DIAGNOSIS — R07.2 CHEST PAIN, PRECORDIAL: ICD-10-CM

## 2023-03-24 DIAGNOSIS — I10 PRIMARY HYPERTENSION: ICD-10-CM

## 2023-03-24 DIAGNOSIS — I25.10 3-VESSEL CAD: Primary | ICD-10-CM

## 2023-03-24 DIAGNOSIS — M79.89 LEG SWELLING: ICD-10-CM

## 2023-03-24 PROCEDURE — 1159F MED LIST DOCD IN RCRD: CPT | Performed by: NURSE PRACTITIONER

## 2023-03-24 PROCEDURE — 99213 OFFICE O/P EST LOW 20 MIN: CPT | Performed by: NURSE PRACTITIONER

## 2023-03-24 PROCEDURE — 1160F RVW MEDS BY RX/DR IN RCRD: CPT | Performed by: NURSE PRACTITIONER

## 2023-03-24 NOTE — PROGRESS NOTES
Subjective     Miguel Allen is a 73 y.o. male who presents to day for Follow-up (CATH F/U ).    CHIEF COMPLIANT  Chief Complaint   Patient presents with   • Follow-up     CATH F/U        Active Problems:  Problem List Items Addressed This Visit    None  Visit Diagnoses     3-vessel CAD    -  Primary    Relevant Orders    Ambulatory Referral to Cardiac Rehab    Chest pain, precordial        Relevant Orders    Ambulatory Referral to Cardiac Rehab    Primary hypertension        Relevant Orders    Ambulatory Referral to Cardiac Rehab    Leg swelling        Relevant Orders    Ambulatory Referral to Cardiac Rehab      Problem list  1.  Chest pain  1.1 left heart cath 2/23: Left main Short normal, LAD heavily calcified 40 to 50% proximal 70 to 80% mid, 50 to 60% apical, diffuse disease D1 and D2.  Left to right collaterals seen filling the distal right PDA and PLV.  Circumflex proximal 70% stenosis OM1 60 to 70%, %  1.2 left heart cath 3/23: Stenting of the LAD x3, stenting of the circumflex x2,  RCA  2.  Palpitations  2.1 Holter monitor 12/22: PSVT with a 1% PAC burden, 1 run VT lasting 3 beats.  Bradycardic 34.5% of the time.  Symptoms occurred during sinus bradycardia to sinus tachycardia  3.  Hypertension  4.  Lower extremity edema       HPI  HPI    Miguel Allen is a 73-year-old male patient being followed up today status post left heart catheterization. Patient was referred to open heart surgery, however, was not interested in proceeding and wanted to go with a percutaneous approach. Therefore, he was taken back to the cath lab and receive stents to his LAD times 3 and stents to the circumflex times 2. The RCA is chronically totally occluded. The patient is accompanied by an adult female.    The adult female states the patient is doing well. He denies pain, numbness or tingling in his hand.    The adult female states the patient's lower extremity edema was bad this morning. She gave the patient  his furosemide.    She states the patient's urine was strong the day after his heart catheterization. She states he drinks plenty of water.  She reports that he seems to be doing relatively well after the left heart catheterization and she can tell improvement.    PRIOR MEDS  Current Outpatient Medications on File Prior to Visit   Medication Sig Dispense Refill   • amLODIPine (NORVASC) 5 MG tablet Take 1 tablet by mouth Daily. 90 tablet 3   • aspirin 81 MG EC tablet Take 1 tablet by mouth Daily.     • busPIRone (BUSPAR) 5 MG tablet Take 1 tablet by mouth Daily.     • Cinnamon 500 MG tablet Take 500 mg by mouth Daily. 2 tabs po qd     • clopidogrel (PLAVIX) 75 MG tablet Take 1 tablet by mouth Daily. 90 tablet 3   • DICLOFENAC PO Take 50 mg by mouth 2 (Two) Times a Day.     • donepezil (ARICEPT) 5 MG tablet Take 1 tablet by mouth Every Night.     • doxazosin (CARDURA) 8 MG tablet Take 1 tablet by mouth Every Night. 90 tablet 3   • finasteride (PROSCAR) 5 MG tablet Take 1 tablet by mouth Daily.     • furosemide (LASIX) 20 MG tablet Take 1 tablet by mouth Daily.     • isosorbide mononitrate (IMDUR) 30 MG 24 hr tablet Take 1 tablet by mouth Daily. 90 tablet 3   • lisinopril (PRINIVIL,ZESTRIL) 40 MG tablet Take 1 tablet by mouth Daily.     • metoprolol succinate XL (TOPROL-XL) 25 MG 24 hr tablet Take 1 tablet by mouth Daily.     • mirtazapine (REMERON) 15 MG tablet Take 1 tablet by mouth Every Night.     • nitroglycerin (NITROSTAT) 0.4 MG SL tablet 1 under the tongue as needed for angina, may repeat q5mins for up three doses 30 tablet 5   • Omega-3 Fatty Acids (FISH OIL PO) Take 1,400 mg by mouth Daily.     • potassium chloride 10 MEQ CR tablet Take 1 tablet by mouth 2 (Two) Times a Day.     • rosuvastatin (CRESTOR) 40 MG tablet Take 1 tablet by mouth Daily. 90 tablet 3   • Saw Palmetto 450 MG capsule Take  by mouth.     • Turmeric (QC TUMERIC COMPLEX PO) Take 500 mg by mouth Daily.     • VITAMIN D, CHOLECALCIFEROL, PO  "Take 125 mcg by mouth Daily.     • cetirizine (zyrTEC) 10 MG tablet Take 1 tablet by mouth Daily.       No current facility-administered medications on file prior to visit.       ALLERGIES  Patient has no known allergies.    HISTORY  Past Medical History:   Diagnosis Date   • Aneurysm (HCC)    • Arthritis    • Colon polyps    • Gall stones    • GERD (gastroesophageal reflux disease)    • Hyperlipidemia    • Hypertension    • Kidney stones    • Lewy body dementia (HCC)    • Parkinson's disease (HCC)    • Stroke (HCC)        Social History     Socioeconomic History   • Marital status:    Tobacco Use   • Smoking status: Former     Packs/day: 1.00     Years: 10.00     Pack years: 10.00     Types: Cigarettes   • Smokeless tobacco: Never   Vaping Use   • Vaping Use: Never used   Substance and Sexual Activity   • Alcohol use: Never   • Drug use: Never   • Sexual activity: Defer       Family History   Problem Relation Age of Onset   • Dementia Mother    • Hypertension Father    • Diabetes Father        Review of Systems   Constitutional: Negative for appetite change, chills and fever.   HENT: Negative.  Negative for dental problem, drooling, ear discharge, ear pain, sinus pressure, sneezing, sore throat and tinnitus.    Eyes: Negative for pain, redness, itching and visual disturbance.   Respiratory: Negative for cough, choking and wheezing.    Cardiovascular: Positive for leg swelling. Negative for chest pain and palpitations.   Gastrointestinal: Negative for blood in stool, constipation, diarrhea and nausea.   Genitourinary: Negative.  Negative for difficulty urinating.   Musculoskeletal: Positive for arthralgias. Negative for back pain and neck pain.   Skin: Negative for rash and wound.   Neurological: Positive for dizziness and weakness. Negative for numbness.   Psychiatric/Behavioral: Negative for sleep disturbance.       Objective     VITALS: /65   Pulse 62   Ht 180.3 cm (70.98\")   Wt 84.5 kg (186 lb " 3.2 oz)   SpO2 96%   BMI 25.98 kg/m²     LABS:   Lab Results (most recent)     None          IMAGING:   No Images in the past 120 days found..    EXAM:  Physical Exam  Vitals and nursing note reviewed.   Constitutional:       Appearance: He is well-developed.   HENT:      Head: Normocephalic and atraumatic.   Eyes:      Pupils: Pupils are equal, round, and reactive to light.   Neck:      Vascular: No carotid bruit or JVD.   Cardiovascular:      Rate and Rhythm: Normal rate and regular rhythm.      Pulses:           Carotid pulses are 2+ on the right side and 2+ on the left side.       Radial pulses are 2+ on the right side and 2+ on the left side.        Posterior tibial pulses are 2+ on the right side and 2+ on the left side.      Heart sounds: No murmur heard.    Gallop present.   Pulmonary:      Effort: Pulmonary effort is normal. No respiratory distress.      Breath sounds: Normal breath sounds.   Abdominal:      General: Bowel sounds are normal. There is no distension.      Palpations: Abdomen is soft.      Tenderness: There is no abdominal tenderness.   Musculoskeletal:         General: Swelling (1-2+ ble) present. Normal range of motion.      Cervical back: Neck supple.   Skin:     General: Skin is warm and dry.   Neurological:      Mental Status: He is alert. Mental status is at baseline.      Cranial Nerves: No cranial nerve deficit.      Sensory: No sensory deficit.   Psychiatric:         Speech: Speech normal.         Behavior: Behavior normal.         Procedure   Procedures       Assessment & Plan    Diagnosis Plan   1. 3-vessel CAD  Ambulatory Referral to Cardiac Rehab      2. Chest pain, precordial  Ambulatory Referral to Cardiac Rehab      3. Primary hypertension  Ambulatory Referral to Cardiac Rehab      4. Leg swelling  Ambulatory Referral to Cardiac Rehab           1. The patient's cardiac catheterization results were discussed in full detail with the patient.  Patient did receive a total of 5  stents 3 to the LAD and 2 to the circumflex.  Did discuss the significant benefit of dual antiplatelet therapy.  His wife verbalized understanding.  2. The patient's blood pressure is 116/65 mmHg with a heart rate of 62 BPM.  Patient's blood pressure is controlled on current blood pressure medication regimen.  No medication changes are warranted at this time.  Patient advised to monitor blood pressure on a daily basis and report any persistent highs or lows.  Set goal blood pressure for patient at 130/80 or below.  3. The patient's echocardiogram results were discussed in full detail with the patient.  4. The patient will be referred for [facility] rehab to help him start exercising more often.  His wife verbalizes she is unsure of how much exercise that he can do and feels that he would benefit from an organized physical therapy/rehab.  5.  Informed of signs and symptoms of ACS and advised to seek emergent treatment for any new worsening symptoms.  Patient also advised sooner follow-up as needed.  Also advised to follow-up with family doctor as needed  This note is dictated utilizing voice recognition software.  Although this record has been proof read, transcriptional errors may still be present. If questions occur regarding the content of this record please do not hesitate to call our office.  I have reviewed and confirmed the accuracy of the ROS as documented by the MA/LPN/RN JOHN Gonzalez  6.  The patient was advised to contact the office if any cardiac issues arise.    Return in about 3 months (around 6/24/2023), or if symptoms worsen or fail to improve.    Diagnoses and all orders for this visit:    1. 3-vessel CAD (Primary)  -     Ambulatory Referral to Cardiac Rehab    2. Chest pain, precordial  -     Ambulatory Referral to Cardiac Rehab    3. Primary hypertension  -     Ambulatory Referral to Cardiac Rehab    4. Leg swelling  -     Ambulatory Referral to Cardiac Rehab        Miguel Allen  reports  that he has quit smoking. His smoking use included cigarettes. He has a 10.00 pack-year smoking history. He has never used smokeless tobacco..            Advance Care Planning   ACP discussion was declined by the patient. Patient has an advance directive in EMR which is still valid.             MEDS ORDERED DURING VISIT:  No orders of the defined types were placed in this encounter.          This document has been electronically signed by JOHN Gonzalez Jr.  March 27, 2023 00:10 EDT      Transcribed from ambient dictation for JOHN Gonzalez by Adry [employer] .  03/24/23   12:25 EDT    Patient or patient representative verbalized consent to the visit recording.  I have personally performed the services described in this document as transcribed by the above individual, and it is both accurate and complete.

## 2023-03-29 ENCOUNTER — OFFICE VISIT (OUTPATIENT)
Dept: CARDIOLOGY | Facility: CLINIC | Age: 73
End: 2023-03-29
Payer: MEDICARE

## 2023-03-29 DIAGNOSIS — R07.2 CHEST PAIN, PRECORDIAL: ICD-10-CM

## 2023-03-29 DIAGNOSIS — R93.89 ABNORMAL CT OF THE CHEST: ICD-10-CM

## 2023-03-29 DIAGNOSIS — R00.2 PALPITATIONS: ICD-10-CM

## 2023-03-29 DIAGNOSIS — I10 PRIMARY HYPERTENSION: ICD-10-CM

## 2023-03-29 DIAGNOSIS — I25.10 3-VESSEL CAD: ICD-10-CM

## 2023-05-08 DIAGNOSIS — R07.2 CHEST PAIN, PRECORDIAL: ICD-10-CM

## 2023-05-08 DIAGNOSIS — I25.10 3-VESSEL CAD: ICD-10-CM

## 2023-05-08 DIAGNOSIS — R93.89 ABNORMAL CT OF THE CHEST: ICD-10-CM

## 2023-05-08 DIAGNOSIS — R00.2 PALPITATIONS: ICD-10-CM

## 2023-05-08 DIAGNOSIS — I10 PRIMARY HYPERTENSION: ICD-10-CM

## 2023-05-08 RX ORDER — AMLODIPINE BESYLATE 5 MG/1
5 TABLET ORAL DAILY
Qty: 90 TABLET | Refills: 3 | Status: SHIPPED | OUTPATIENT
Start: 2023-05-08

## 2023-05-17 ENCOUNTER — TRANSCRIBE ORDERS (OUTPATIENT)
Dept: LAB | Facility: HOSPITAL | Age: 73
End: 2023-05-17
Payer: MEDICARE

## 2023-05-17 ENCOUNTER — LAB (OUTPATIENT)
Dept: LAB | Facility: HOSPITAL | Age: 73
End: 2023-05-17
Payer: MEDICARE

## 2023-05-17 DIAGNOSIS — Z95.1 PRESENCE OF CORONARY ARTERY BYPASS GRAFT STENT: ICD-10-CM

## 2023-05-17 DIAGNOSIS — Z95.5 PRESENCE OF CORONARY ANGIOPLASTY IMPLANT AND GRAFT: ICD-10-CM

## 2023-05-17 DIAGNOSIS — Z95.5 PRESENCE OF CORONARY ANGIOPLASTY IMPLANT AND GRAFT: Primary | ICD-10-CM

## 2023-05-17 DIAGNOSIS — Z95.5 PRESENCE OF CORONARY ARTERY BYPASS GRAFT STENT: ICD-10-CM

## 2023-05-17 LAB
CHOLEST SERPL-MCNC: 127 MG/DL (ref 0–200)
HDLC SERPL-MCNC: 31 MG/DL (ref 40–60)
LDLC SERPL CALC-MCNC: 71 MG/DL (ref 0–100)
LDLC/HDLC SERPL: 2.18 {RATIO}
TRIGL SERPL-MCNC: 142 MG/DL (ref 0–150)
VLDLC SERPL-MCNC: 25 MG/DL (ref 5–40)

## 2023-05-17 PROCEDURE — 80061 LIPID PANEL: CPT | Performed by: INTERNAL MEDICINE

## 2023-06-14 ENCOUNTER — TELEPHONE (OUTPATIENT)
Dept: CARDIOLOGY | Facility: CLINIC | Age: 73
End: 2023-06-14

## 2023-06-14 NOTE — TELEPHONE ENCOUNTER
Caller: Miguel Allen    Relationship: Self    Best call back number: [phone]    What is the best time to reach you: ANY    Who are you requesting to speak with (clinical staff, provider,  specific staff member): ANY    What was the call regarding: PT IS NEEDING A [facility] FOLLOW UP

## 2023-06-28 PROBLEM — I10 ESSENTIAL HYPERTENSION: Status: ACTIVE | Noted: 2023-06-28

## 2023-06-28 PROBLEM — Z95.5 HISTORY OF CORONARY ANGIOPLASTY WITH INSERTION OF STENT: Status: ACTIVE | Noted: 2023-06-28

## 2023-06-28 PROBLEM — E78.5 DYSLIPIDEMIA: Status: ACTIVE | Noted: 2023-06-28

## 2023-06-28 PROBLEM — I25.10 CORONARY ARTERY DISEASE INVOLVING NATIVE CORONARY ARTERY OF NATIVE HEART WITHOUT ANGINA PECTORIS: Status: ACTIVE | Noted: 2023-06-28

## 2023-09-27 ENCOUNTER — TELEPHONE (OUTPATIENT)
Dept: CARDIOLOGY | Facility: CLINIC | Age: 73
End: 2023-09-27
Payer: MEDICARE

## 2023-09-28 ENCOUNTER — TELEPHONE (OUTPATIENT)
Dept: CARDIOLOGY | Facility: CLINIC | Age: 73
End: 2023-09-28
Payer: MEDICARE

## 2023-09-28 NOTE — TELEPHONE ENCOUNTER
Caller: Lianet Allen    Relationship to patient: Emergency Contact    Best call back number: [phone]    Chief complaint:     Type of visit: FOLLOW UP    Requested date: SOON     If rescheduling, when is the original appointment: 4.1.2024     Additional notes:PATIENT'S WIFE HAD TO RESCHEDULE PER OFFICE REQUEST SHE WANTS PATIENT SEEN SOONER THAN 4.1.2024 HE HAS BEEN ADMITTED TWICE INTO THE HOSPITAL THIS SUMMER. SHE ALSO ADDS HE CAN BE SCHEDULED WITH ASHELY ANAYA. PLEASE CALL TO RESCHEDULE

## 2023-10-09 ENCOUNTER — OFFICE VISIT (OUTPATIENT)
Dept: CARDIOLOGY | Facility: CLINIC | Age: 73
End: 2023-10-09
Payer: MEDICARE

## 2023-10-09 VITALS
DIASTOLIC BLOOD PRESSURE: 56 MMHG | HEART RATE: 81 BPM | OXYGEN SATURATION: 93 % | SYSTOLIC BLOOD PRESSURE: 96 MMHG | HEIGHT: 71 IN | BODY MASS INDEX: 24.92 KG/M2

## 2023-10-09 DIAGNOSIS — I25.10 CORONARY ARTERY DISEASE INVOLVING NATIVE CORONARY ARTERY OF NATIVE HEART WITHOUT ANGINA PECTORIS: ICD-10-CM

## 2023-10-09 DIAGNOSIS — I10 ESSENTIAL HYPERTENSION: Primary | ICD-10-CM

## 2023-10-09 PROCEDURE — 99214 OFFICE O/P EST MOD 30 MIN: CPT | Performed by: CLINICAL NURSE SPECIALIST

## 2023-10-09 PROCEDURE — 3078F DIAST BP <80 MM HG: CPT | Performed by: CLINICAL NURSE SPECIALIST

## 2023-10-09 PROCEDURE — 3074F SYST BP LT 130 MM HG: CPT | Performed by: CLINICAL NURSE SPECIALIST

## 2023-10-09 NOTE — PROGRESS NOTES
Subjective     Miguel Allen is a 73 y.o. male who presents today for Follow-up and Coronary Artery Disease.    CHIEF COMPLIANT  Chief Complaint   Patient presents with    Follow-up    Coronary Artery Disease       Active Problems:  1.  Chest pain  1.1 left heart cath 2/23: Left main Short normal, LAD heavily calcified 40 to 50% proximal 70 to 80% mid, 50 to 60% apical, diffuse disease D1 and D2.  Left to right collaterals seen filling the distal right PDA and PLV.  Circumflex proximal 70% stenosis OM1 60 to 70%, %  1.2 left heart cath 3/23: Stenting of the LAD x3, stenting of the circumflex x2,  RCA  2.  Palpitations  2.1 Holter monitor 12/22: PSVT with a 1% PAC burden, 1 run VT lasting 3 beats.  Bradycardic 34.5% of the time.  Symptoms occurred during sinus bradycardia to sinus tachycardia  3.  Hypertension  4.  Lower extremity edema    HPI  The patient is a 73-year-old male who returns for follow-up after recent hospitalization due to low blood pressure, lethargy, and ultimately he was found to have a UTI.  [facility] found to have a blood pressure in the 60s systolic.  He was sent from there to the emergency department.  Patient does have dementia at baseline and is currently residing at [facility].  His wife accompanies him to the visit today.  Patient's wife states that he has continued to be somewhat lethargic at times and have low energy level when she has visited him.  His blood pressure today remains low at 96/56 and he is falling asleep during the visit today.  He will wake up easily and will answer questions when prompted. It does not appear that he had medication changes at discharge from the hospital.  His wife does state that he has lost over 20 pounds in the past few months.    PRIOR MEDS  Current Outpatient Medications on File Prior to Visit   Medication Sig Dispense Refill    amLODIPine (NORVASC) 5 MG tablet Take 1 tablet by mouth Daily. 90  tablet 3    busPIRone (BUSPAR) 5 MG tablet Take 1 tablet by mouth Daily.      clopidogrel (PLAVIX) 75 MG tablet Take 1 tablet by mouth Daily. 90 tablet 3    donepezil (ARICEPT) 5 MG tablet Take 1 tablet by mouth Every Night.      finasteride (PROSCAR) 5 MG tablet Take 1 tablet by mouth Daily.      furosemide (LASIX) 20 MG tablet Take 1 tablet by mouth Daily As Needed.      isosorbide mononitrate (IMDUR) 30 MG 24 hr tablet Take 1 tablet by mouth Daily. 90 tablet 3    lisinopril (PRINIVIL,ZESTRIL) 40 MG tablet Take 1 tablet by mouth Daily.      metoprolol succinate XL (TOPROL-XL) 25 MG 24 hr tablet Take 1 tablet by mouth Daily.      mirtazapine (REMERON) 15 MG tablet Take 1 tablet by mouth Every Night.      nitroglycerin (NITROSTAT) 0.4 MG SL tablet 1 under the tongue as needed for angina, may repeat q5mins for up three doses 30 tablet 5    potassium chloride 10 MEQ CR tablet Take 1 tablet by mouth 2 (Two) Times a Day.      rosuvastatin (CRESTOR) 40 MG tablet Take 1 tablet by mouth Daily. 90 tablet 3    [DISCONTINUED] cetirizine (zyrTEC) 10 MG tablet Take 1 tablet by mouth Daily.      aspirin 81 MG EC tablet Take 1 tablet by mouth Daily. (Patient not taking: Reported on 10/9/2023)      DICLOFENAC PO Take 50 mg by mouth 2 (Two) Times a Day.      [DISCONTINUED] Cinnamon 500 MG tablet Take 500 mg by mouth Daily. 2 tabs po qd      [DISCONTINUED] Omega-3 Fatty Acids (FISH OIL PO) Take 1,400 mg by mouth Daily.      [DISCONTINUED] Saw Palmetto 450 MG capsule Take  by mouth.      [DISCONTINUED] Turmeric (QC TUMERIC COMPLEX PO) Take 500 mg by mouth Daily.      [DISCONTINUED] VITAMIN D, CHOLECALCIFEROL, PO Take 125 mcg by mouth Daily.       No current facility-administered medications on file prior to visit.       ALLERGIES  Patient has no known allergies.    HISTORY  Past Medical History:   Diagnosis Date    Aneurysm     Arthritis     Colon polyps     Gall stones     GERD (gastroesophageal reflux disease)     Hyperlipidemia   "   Hypertension     Kidney stones     Lewy body dementia     Parkinson's disease     Stroke        Social History     Socioeconomic History    Marital status:    Tobacco Use    Smoking status: Former     Packs/day: 1.00     Years: 10.00     Additional pack years: 0.00     Total pack years: 10.00     Types: Cigarettes    Smokeless tobacco: Never   Vaping Use    Vaping Use: Never used   Substance and Sexual Activity    Alcohol use: Never    Drug use: Never    Sexual activity: Defer       Family History   Problem Relation Age of Onset    Dementia Mother     Hypertension Father     Diabetes Father        Review of Systems   Constitutional:  Negative for chills, diaphoresis, fatigue and fever.        ROS verbalized and assisted by spouse    HENT: Negative.     Eyes:  Negative for visual disturbance (glasses).   Respiratory: Negative.  Negative for apnea, cough, chest tightness, shortness of breath and wheezing.    Cardiovascular:  Positive for palpitations (occas. flutter or palp). Negative for chest pain and leg swelling.   Gastrointestinal: Negative.  Negative for blood in stool.   Endocrine: Negative.  Negative for cold intolerance and heat intolerance.   Genitourinary: Negative.  Negative for hematuria.   Musculoskeletal:  Positive for arthralgias, back pain (spinal stenosis), gait problem (uses wheelchair) and neck pain. Negative for myalgias and neck stiffness.        Does not stand up since breaking left hip   Skin: Negative.  Negative for color change, rash and wound.   Allergic/Immunologic: Negative.  Negative for environmental allergies and food allergies.   Neurological:  Negative for dizziness, syncope, weakness, light-headedness, numbness and headaches.   Hematological:  Bruises/bleeds easily.   Psychiatric/Behavioral: Negative.  Negative for sleep disturbance.        Objective     VITALS: BP 96/56 (BP Location: Right arm, Patient Position: Sitting)   Pulse 81   Ht 180 cm (70.87\")   SpO2 93% " Comment: room air  BMI 24.92 kg/mý     LABS:   Lab Results (most recent)       None            IMAGING:   No Images in the past 120 days found..    EXAM:  Physical Exam  Constitutional:       Appearance: Normal appearance.   Eyes:      Pupils: Pupils are equal, round, and reactive to light.   Cardiovascular:      Rate and Rhythm: Normal rate and regular rhythm.      Pulses:           Carotid pulses are 2+ on the right side and 2+ on the left side.       Radial pulses are 2+ on the right side and 2+ on the left side.        Dorsalis pedis pulses are 2+ on the right side and 2+ on the left side.        Posterior tibial pulses are 2+ on the right side and 2+ on the left side.      Heart sounds: Normal heart sounds.   Pulmonary:      Effort: Pulmonary effort is normal.      Breath sounds: Normal breath sounds.   Abdominal:      General: Bowel sounds are normal.      Palpations: Abdomen is soft.   Musculoskeletal:      Right lower leg: No edema.      Left lower leg: No edema.   Skin:     General: Skin is warm and dry.      Capillary Refill: Capillary refill takes less than 2 seconds.   Neurological:      Mental Status: He is alert. Mental status is at baseline.         Procedure   Procedures       Assessment & Plan    Diagnosis Plan   1. Essential hypertension        2. Coronary artery disease involving native coronary artery of native heart without angina pectoris          Plan:  1.  Essential hypertension: The patient's blood pressure has been running very low.  It remains low today at 96/56.  We will decrease his lisinopril from 40 mg to 20 mg daily and his amlodipine from 5 mg to 2.5mg daily.  These medication changes were called to the nursing home and orders given to the patient's nurse, Mirtha.  They do monitor his blood pressure on a daily basis there.  If his blood pressure does not improve we may need to further decrease or discontinue medications.  2.  CAD: Continue Toprol, Imdur, statin, Plavix.  He is not  on aspirin.  He is only taking Plavix as he did have a fall in June and had a brain bleed.  It was recommended that he stay on monotherapy.  Continue decreased dose of lisinopril and amlodipine.    No follow-ups on file.    Patient did not bring med list or medicine bottles to appointment, med list has been reviewed and updated based on patient's knowledge of their meds.     Advance Care Planning   ACP discussion was held with the patient during this visit. Patient has an advance directive (not in EMR), copy requested.            MEDS ORDERED DURING VISIT:  No orders of the defined types were placed in this encounter.      DISCONTINUED MEDS DURING VISIT:   Medications Discontinued During This Encounter   Medication Reason    cetirizine (zyrTEC) 10 MG tablet Patient Reported Not Taking    Cinnamon 500 MG tablet Patient Reported Not Taking    Omega-3 Fatty Acids (FISH OIL PO) Patient Reported Not Taking    Saw Palmetto 450 MG capsule Patient Reported Not Taking    Turmeric (QC TUMERIC COMPLEX PO) Patient Reported Not Taking    VITAMIN D, CHOLECALCIFEROL, PO Patient Reported Not Taking          This document has been electronically signed by JOHN Pollock  October 9, 2023 17:06 EDT    Dictated Utilizing Dragon Dictation: Part of this note may be an electronic transcription/translation of spoken language to printed text using the Dragon Dictation System

## 2024-06-21 ENCOUNTER — OFFICE VISIT (OUTPATIENT)
Dept: CARDIOLOGY | Facility: CLINIC | Age: 74
End: 2024-06-21
Payer: MEDICARE

## 2024-06-21 VITALS
HEIGHT: 70 IN | BODY MASS INDEX: 25.54 KG/M2 | DIASTOLIC BLOOD PRESSURE: 61 MMHG | HEART RATE: 74 BPM | SYSTOLIC BLOOD PRESSURE: 101 MMHG | OXYGEN SATURATION: 98 %

## 2024-06-21 DIAGNOSIS — G30.1 SEVERE LATE ONSET ALZHEIMER'S DEMENTIA WITH ANXIETY: ICD-10-CM

## 2024-06-21 DIAGNOSIS — Z95.5 HISTORY OF CORONARY ANGIOPLASTY WITH INSERTION OF STENT: ICD-10-CM

## 2024-06-21 DIAGNOSIS — I10 ESSENTIAL HYPERTENSION: ICD-10-CM

## 2024-06-21 DIAGNOSIS — E78.5 DYSLIPIDEMIA: ICD-10-CM

## 2024-06-21 DIAGNOSIS — I25.10 CORONARY ARTERY DISEASE INVOLVING NATIVE CORONARY ARTERY OF NATIVE HEART WITHOUT ANGINA PECTORIS: Primary | ICD-10-CM

## 2024-06-21 DIAGNOSIS — F02.C4 SEVERE LATE ONSET ALZHEIMER'S DEMENTIA WITH ANXIETY: ICD-10-CM

## 2024-06-21 PROBLEM — F03.C0 ADVANCED DEMENTIA: Status: ACTIVE | Noted: 2024-06-21

## 2024-06-21 PROCEDURE — 3074F SYST BP LT 130 MM HG: CPT | Performed by: SPECIALIST

## 2024-06-21 PROCEDURE — 3078F DIAST BP <80 MM HG: CPT | Performed by: SPECIALIST

## 2024-06-21 PROCEDURE — 99214 OFFICE O/P EST MOD 30 MIN: CPT | Performed by: SPECIALIST

## 2024-06-21 RX ORDER — RISPERIDONE 0.25 MG/1
0.25 TABLET ORAL DAILY
COMMUNITY

## 2024-06-21 RX ORDER — LISINOPRIL 20 MG/1
20 TABLET ORAL DAILY
Qty: 90 TABLET | Refills: 1 | Status: SHIPPED | COMMUNITY
Start: 2024-06-21

## 2024-06-21 RX ORDER — TAMSULOSIN HYDROCHLORIDE 0.4 MG/1
1 CAPSULE ORAL DAILY
COMMUNITY

## 2024-06-21 NOTE — PROGRESS NOTES
Subjective   Follow up, CAD, HTN  Miguel Allen is a 74 y.o. male who presents to day for Coronary artery disease involving native coronary artery of and Follow-up.    CHIEF COMPLIANT  Chief Complaint   Patient presents with    Coronary artery disease involving native coronary artery of    Follow-up       Active Problems:  Problem List Items Addressed This Visit          Cardiac and Vasculature    Coronary artery disease involving native coronary artery of native heart without angina pectoris - Primary    History of coronary angioplasty with insertion of stent    Essential hypertension    Relevant Medications    lisinopril (PRINIVIL,ZESTRIL) 20 MG tablet    Dyslipidemia    Relevant Orders    Lipid Panel    Comprehensive Metabolic Panel       Neuro    Advanced dementia    Relevant Medications    risperiDONE (risperDAL) 0.25 MG tablet       HPI  HPI  Advanced dementia he is a nursing home resident talk to his wife patient has no chest pain or shortness of breath or other complaints but he is not active at all  PRIOR MEDS  Current Outpatient Medications on File Prior to Visit   Medication Sig Dispense Refill    aspirin 81 MG EC tablet Take 1 tablet by mouth Daily.      busPIRone (BUSPAR) 5 MG tablet Take 2 tablets by mouth Daily.      carbidopa-levodopa (SINEMET)  MG per tablet Take 1 tablet by mouth 3 (Three) Times a Day.      clopidogrel (PLAVIX) 75 MG tablet Take 1 tablet by mouth Daily. 90 tablet 3    donepezil (ARICEPT) 5 MG tablet Take 1 tablet by mouth Every Night.      furosemide (LASIX) 20 MG tablet Take 1 tablet by mouth Daily As Needed.      isosorbide mononitrate (IMDUR) 30 MG 24 hr tablet Take 1 tablet by mouth Daily. 90 tablet 3    lisinopril (PRINIVIL,ZESTRIL) 20 MG tablet Take 1 tablet by mouth Daily. 90 tablet 1    metoprolol succinate XL (TOPROL-XL) 25 MG 24 hr tablet Take 1 tablet by mouth Daily.      risperiDONE (risperDAL) 0.25 MG tablet Take 1 tablet by mouth Daily.      rosuvastatin  (CRESTOR) 40 MG tablet Take 1 tablet by mouth Daily. 90 tablet 3    tamsulosin (FLOMAX) 0.4 MG capsule 24 hr capsule Take 1 capsule by mouth Daily.      [DISCONTINUED] amLODIPine (NORVASC) 5 MG tablet Take 1 tablet by mouth Daily. (Patient taking differently: Take 0.5 tablets by mouth Daily.) 90 tablet 3    [DISCONTINUED] lisinopril (PRINIVIL,ZESTRIL) 40 MG tablet Take 0.5 tablets by mouth Daily.      nitroglycerin (NITROSTAT) 0.4 MG SL tablet 1 under the tongue as needed for angina, may repeat q5mins for up three doses (Patient not taking: Reported on 2024) 30 tablet 5    [DISCONTINUED] DICLOFENAC PO Take 50 mg by mouth 2 (Two) Times a Day. (Patient not taking: Reported on 2024)      [DISCONTINUED] finasteride (PROSCAR) 5 MG tablet Take 1 tablet by mouth Daily. (Patient not taking: Reported on 2024)      [DISCONTINUED] mirtazapine (REMERON) 15 MG tablet Take 1 tablet by mouth Every Night. (Patient not taking: Reported on 2024)      [DISCONTINUED] potassium chloride 10 MEQ CR tablet Take 1 tablet by mouth 2 (Two) Times a Day. (Patient not taking: Reported on 2024)       No current facility-administered medications on file prior to visit.       ALLERGIES  Patient has no known allergies.    HISTORY  Past Medical History:   Diagnosis Date    Aneurysm     Arthritis     CHF (congestive heart failure)     Colon polyps     Coronary artery disease     Gall stones     GERD (gastroesophageal reflux disease)     Hyperlipidemia     Hypertension     Kidney stones     Lewy body dementia     Parkinson's disease     Stroke        Social History     Socioeconomic History    Marital status:    Tobacco Use    Smoking status: Former     Current packs/day: 0.00     Average packs/day: 1.5 packs/day for 20.0 years (30.0 ttl pk-yrs)     Types: Cigarettes     Quit date: 1980     Years since quittin.5    Smokeless tobacco: Never   Vaping Use    Vaping status: Never Used   Substance and  "Sexual Activity    Alcohol use: Not Currently    Drug use: Never    Sexual activity: Not Currently     Partners: Female       Family History   Problem Relation Age of Onset    Dementia Mother     Hypertension Father     Diabetes Father        Review of Systems   Constitutional: Negative.  Negative for chills, fatigue and fever.   HENT: Negative.     Eyes: Negative.  Negative for visual disturbance.   Respiratory: Negative.  Negative for cough, shortness of breath and wheezing.    Cardiovascular: Negative.  Negative for chest pain, palpitations and leg swelling.   Gastrointestinal: Negative.  Negative for abdominal pain and blood in stool.   Endocrine: Negative.    Genitourinary: Negative.  Negative for dysuria.   Musculoskeletal: Negative.  Negative for arthralgias, back pain and neck pain.   Skin: Negative.    Allergic/Immunologic: Negative.  Negative for environmental allergies and food allergies.   Neurological: Negative.  Negative for dizziness, light-headedness, numbness and headaches.   Hematological: Negative.  Does not bruise/bleed easily.   Psychiatric/Behavioral: Negative.  Negative for sleep disturbance.        Objective     VITALS: /61   Pulse 74   Ht 177.8 cm (70\")   SpO2 98%   BMI 25.54 kg/m²     LABS:   Lab Results (most recent)       None            IMAGING:   No Images in the past 120 days found..    EXAM:  Physical Exam  Constitutional:       Appearance: He is well-developed.   HENT:      Head: Normocephalic and atraumatic.   Eyes:      Pupils: Pupils are equal, round, and reactive to light.   Neck:      Thyroid: No thyromegaly.      Vascular: No JVD.   Cardiovascular:      Rate and Rhythm: Normal rate and regular rhythm.      Chest Wall: PMI is not displaced.      Pulses: Normal pulses.      Heart sounds: Normal heart sounds, S1 normal and S2 normal. No murmur heard.     No friction rub. No gallop.   Pulmonary:      Effort: Pulmonary effort is normal. No respiratory distress.      Breath " sounds: Normal breath sounds. No stridor. No wheezing or rales.   Chest:      Chest wall: No tenderness.   Abdominal:      General: Bowel sounds are normal. There is no distension.      Palpations: Abdomen is soft. There is no mass.      Tenderness: There is no abdominal tenderness. There is no guarding or rebound.   Musculoskeletal:      Cervical back: Neck supple. No edema.   Skin:     General: Skin is warm and dry.      Coloration: Skin is not pale.      Findings: No erythema or rash.   Neurological:      Mental Status: He is alert and oriented to person, place, and time.      Cranial Nerves: No cranial nerve deficit.      Coordination: Coordination normal.   Psychiatric:         Behavior: Behavior normal.         Procedure   Procedures        Assessment & Plan     Diagnoses and all orders for this visit:    1. Coronary artery disease involving native coronary artery of native heart without angina pectoris (Primary)    2. History of coronary angioplasty with insertion of stent    3. Essential hypertension    4. Dyslipidemia  -     Lipid Panel; Future  -     Comprehensive Metabolic Panel; Future    5. Severe late onset Alzheimer's dementia with anxiety    1.  Patient has no chest pain or told with no complaints we will continue with the daily aspirin  2.  His blood pressure is relatively low so I am going to discontinue the amlodipine and continue with the current dose of lisinopril 20 mg/day and continue with the also the furosemide and metoprolol  3.  I do not have any recent lipid profile we will continue with the rosuvastatin and we will get labs prior to his next visit  4.  Now has advanced dementia so we will continue with conservative management    Return in about 6 months (around 12/21/2024).      Advance Care Planning   ACP discussion was declined by the patient. Patient has an advance directive (not in EMR), copy requested.             MEDS ORDERED DURING VISIT:  No orders of the defined types were  placed in this encounter.      As always, Mary Torres MD  I appreciate very much the opportunity to participate in the cardiovascular care of your patients. Please do not hesitate to call me with any questions with regards to Miguel Allen evaluation and management.         This document has been electronically signed by Mena Durant MD  June 21, 2024 14:53 EDT    This note is dictated utilizing voice recognition software.     Detail Level: Detailed

## 2024-06-24 ENCOUNTER — TELEPHONE (OUTPATIENT)
Dept: CARDIOLOGY | Facility: CLINIC | Age: 74
End: 2024-06-24
Payer: MEDICARE

## 2024-06-24 NOTE — TELEPHONE ENCOUNTER
"Xi w/ [facility] LVM stating pt's notes from appt say something about Lisinopril, but it's scratched out. Needed to know whether to D/C or stop.   #[phone]    Per chart review, OV from 6/21/24 states:  \"2. His blood pressure is relatively low so I am going to discontinue the amlodipine and continue with the current dose of lisinopril 20 mg/day and continue with the also the furosemide and metoprolol.\"       Called and informed Xi of the above, she verbalized understanding.   "

## 2024-11-21 ENCOUNTER — TELEPHONE (OUTPATIENT)
Dept: CARDIOLOGY | Facility: CLINIC | Age: 74
End: 2024-11-21
Payer: MEDICARE

## 2024-11-21 NOTE — TELEPHONE ENCOUNTER
Patient is in a nursing home and would like the lab orders faxed to the facility to have completed. Sent via right fax to [facility] at [phone]. Labs sent as requested.

## 2024-12-18 ENCOUNTER — OFFICE VISIT (OUTPATIENT)
Dept: CARDIOLOGY | Facility: CLINIC | Age: 74
End: 2024-12-18
Payer: MEDICARE

## 2024-12-18 VITALS
SYSTOLIC BLOOD PRESSURE: 106 MMHG | WEIGHT: 143 LBS | DIASTOLIC BLOOD PRESSURE: 74 MMHG | HEIGHT: 70 IN | BODY MASS INDEX: 20.47 KG/M2

## 2024-12-18 DIAGNOSIS — G30.1 SEVERE LATE ONSET ALZHEIMER'S DEMENTIA WITH ANXIETY: ICD-10-CM

## 2024-12-18 DIAGNOSIS — R00.2 PALPITATIONS: ICD-10-CM

## 2024-12-18 DIAGNOSIS — I25.10 3-VESSEL CAD: ICD-10-CM

## 2024-12-18 DIAGNOSIS — F02.C4 SEVERE LATE ONSET ALZHEIMER'S DEMENTIA WITH ANXIETY: ICD-10-CM

## 2024-12-18 DIAGNOSIS — I25.10 CORONARY ARTERY DISEASE INVOLVING NATIVE CORONARY ARTERY OF NATIVE HEART WITHOUT ANGINA PECTORIS: ICD-10-CM

## 2024-12-18 DIAGNOSIS — E78.5 DYSLIPIDEMIA: ICD-10-CM

## 2024-12-18 DIAGNOSIS — I10 PRIMARY HYPERTENSION: ICD-10-CM

## 2024-12-18 DIAGNOSIS — I10 ESSENTIAL HYPERTENSION: Primary | ICD-10-CM

## 2024-12-18 DIAGNOSIS — Z95.5 HISTORY OF CORONARY ANGIOPLASTY WITH INSERTION OF STENT: ICD-10-CM

## 2024-12-18 DIAGNOSIS — R93.89 ABNORMAL CT OF THE CHEST: ICD-10-CM

## 2024-12-18 DIAGNOSIS — R07.2 CHEST PAIN, PRECORDIAL: ICD-10-CM

## 2024-12-18 PROCEDURE — 3078F DIAST BP <80 MM HG: CPT | Performed by: SPECIALIST

## 2024-12-18 PROCEDURE — 99213 OFFICE O/P EST LOW 20 MIN: CPT | Performed by: SPECIALIST

## 2024-12-18 PROCEDURE — 3074F SYST BP LT 130 MM HG: CPT | Performed by: SPECIALIST

## 2024-12-18 RX ORDER — BUSPIRONE HYDROCHLORIDE 10 MG/1
10 TABLET ORAL 3 TIMES DAILY
COMMUNITY

## 2024-12-18 RX ORDER — FINASTERIDE 5 MG/1
5 TABLET, FILM COATED ORAL DAILY
COMMUNITY

## 2024-12-18 RX ORDER — POTASSIUM CHLORIDE 750 MG/1
10 TABLET, EXTENDED RELEASE ORAL 2 TIMES DAILY
COMMUNITY

## 2024-12-18 RX ORDER — ROSUVASTATIN CALCIUM 10 MG/1
20 TABLET, COATED ORAL DAILY
Qty: 90 TABLET | Refills: 1 | Status: SHIPPED | OUTPATIENT
Start: 2024-12-18

## 2024-12-18 NOTE — PROGRESS NOTES
Subjective   Follow up, HTN, CAD, demenia  Miguel Allen is a 74 y.o. male who presents to day for Follow-up (Cardiac management -6 month visit //Labs-12/17/2024//Medication- no refills needed) and Coronary Artery Disease (No complaint of issues today /).    CHIEF COMPLIANT  Chief Complaint   Patient presents with    Follow-up     Cardiac management -6 month visit     Labs-12/17/2024    Medication- no refills needed    Coronary Artery Disease     No complaint of issues today          Active Problems:  Problem List Items Addressed This Visit          Cardiac and Vasculature    Coronary artery disease involving native coronary artery of native heart without angina pectoris    History of coronary angioplasty with insertion of stent    Essential hypertension - Primary    Dyslipidemia       Neuro    Advanced dementia    Relevant Medications    busPIRone (BUSPAR) 10 MG tablet     Other Visit Diagnoses       Palpitations        Relevant Medications    rosuvastatin (CRESTOR) 10 MG tablet    Chest pain, precordial        Relevant Medications    rosuvastatin (CRESTOR) 10 MG tablet    Abnormal CT of the chest        Relevant Medications    rosuvastatin (CRESTOR) 10 MG tablet    3-vessel CAD        Relevant Medications    rosuvastatin (CRESTOR) 10 MG tablet    Primary hypertension        Relevant Medications    rosuvastatin (CRESTOR) 10 MG tablet            HPI  HPI    History of Present Illness     History from wife, patient has advanced demenia, non communicable there is no reported chest pain no shortness of breath edema or other cardiac complaints however he has now advanced dementia he does not recognize his family members he has been losing weight and not eating as much    PRIOR MEDS  Current Outpatient Medications on File Prior to Visit   Medication Sig Dispense Refill    aspirin 81 MG EC tablet Take 1 tablet by mouth Daily.      busPIRone (BUSPAR) 10 MG tablet Take 1 tablet by mouth 3 (Three) Times a Day.       busPIRone (BUSPAR) 5 MG tablet Take 2 tablets by mouth Daily.      carbidopa-levodopa (SINEMET)  MG per tablet Take 1 tablet by mouth 3 (Three) Times a Day.      clopidogrel (PLAVIX) 75 MG tablet Take 1 tablet by mouth Daily. 90 tablet 3    donepezil (ARICEPT) 5 MG tablet Take 1 tablet by mouth Every Night.      finasteride (PROSCAR) 5 MG tablet Take 1 tablet by mouth Daily.      furosemide (LASIX) 20 MG tablet Take 1 tablet by mouth Daily As Needed.      isosorbide mononitrate (IMDUR) 30 MG 24 hr tablet Take 1 tablet by mouth Daily. 90 tablet 3    lisinopril (PRINIVIL,ZESTRIL) 20 MG tablet Take 1 tablet by mouth Daily. 90 tablet 1    metoprolol succinate XL (TOPROL-XL) 25 MG 24 hr tablet Take 1 tablet by mouth Daily.      nitroglycerin (NITROSTAT) 0.4 MG SL tablet 1 under the tongue as needed for angina, may repeat q5mins for up three doses 30 tablet 5    potassium chloride 10 MEQ CR tablet Take 1 tablet by mouth 2 (Two) Times a Day.      risperiDONE (risperDAL) 0.25 MG tablet Take 1 tablet by mouth Daily.      tamsulosin (FLOMAX) 0.4 MG capsule 24 hr capsule Take 1 capsule by mouth Daily.      vitamin D3 125 MCG (5000 UT) capsule capsule Take 1 capsule by mouth Daily.      [DISCONTINUED] rosuvastatin (CRESTOR) 40 MG tablet Take 1 tablet by mouth Daily. 90 tablet 3     No current facility-administered medications on file prior to visit.       ALLERGIES  Patient has no known allergies.    HISTORY  Past Medical History:   Diagnosis Date    Aneurysm     Arthritis     CHF (congestive heart failure) 2023    Colon polyps     Coronary artery disease 2022    Gall stones     GERD (gastroesophageal reflux disease)     Hyperlipidemia     Hypertension     Kidney stones     Lewy body dementia     Parkinson's disease     Stroke        Social History     Socioeconomic History    Marital status:    Tobacco Use    Smoking status: Former     Current packs/day: 0.00     Average packs/day: 1.5 packs/day for 20.0 years  "(30.0 ttl pk-yrs)     Types: Cigarettes     Quit date: 1980     Years since quittin.9    Smokeless tobacco: Never   Vaping Use    Vaping status: Never Used   Substance and Sexual Activity    Alcohol use: Not Currently    Drug use: Never    Sexual activity: Not Currently     Partners: Female       Family History   Problem Relation Age of Onset    Dementia Mother     Hypertension Father     Diabetes Father        Review of Systems   Constitutional: Negative.  Negative for chills, fatigue and fever.   HENT: Negative.  Negative for congestion, rhinorrhea and sore throat.    Eyes: Negative.  Negative for visual disturbance.   Respiratory: Negative.  Negative for chest tightness, shortness of breath and wheezing.    Cardiovascular: Negative.  Negative for chest pain, palpitations and leg swelling.   Gastrointestinal: Negative.    Endocrine: Negative.    Genitourinary: Negative.    Musculoskeletal: Negative.  Negative for arthralgias, back pain and neck pain.   Skin: Negative.    Allergic/Immunologic: Negative.  Negative for environmental allergies.   Neurological:  Negative for dizziness, numbness and headaches.   Hematological:  Bruises/bleeds easily.   Psychiatric/Behavioral:  Negative for sleep disturbance.        Objective     VITALS: /74 (BP Location: Left arm, Patient Position: Sitting, Cuff Size: Adult)   Ht 177.8 cm (70\")   Wt 64.9 kg (143 lb)   BMI 20.52 kg/m²     LABS:   Lab Results (most recent)       None            IMAGING:   No Images in the past 120 days found..    EXAM:  Physical Exam  Constitutional:       Appearance: He is well-developed.   HENT:      Head: Normocephalic and atraumatic.   Eyes:      Pupils: Pupils are equal, round, and reactive to light.   Neck:      Thyroid: No thyromegaly.      Vascular: No JVD.   Cardiovascular:      Rate and Rhythm: Normal rate. Rhythm irregular.      Chest Wall: PMI is not displaced.      Pulses: Normal pulses.      Heart sounds: Normal heart " sounds, S1 normal and S2 normal. No murmur heard.     No friction rub. No gallop.      Comments: Extrasystole  Pulmonary:      Effort: Pulmonary effort is normal. No respiratory distress.      Breath sounds: Normal breath sounds. No stridor. No wheezing or rales.   Chest:      Chest wall: No tenderness.   Abdominal:      General: Bowel sounds are normal. There is no distension.      Palpations: Abdomen is soft. There is no mass.      Tenderness: There is no abdominal tenderness. There is no guarding or rebound.   Musculoskeletal:      Cervical back: Neck supple. No edema.   Skin:     General: Skin is warm and dry.      Coloration: Skin is not pale.      Findings: No erythema or rash.   Neurological:      Mental Status: He is alert and oriented to person, place, and time.      Cranial Nerves: No cranial nerve deficit.      Coordination: Coordination normal.   Psychiatric:         Behavior: Behavior normal.       Physical Exam         Procedure   Procedures      Results         Assessment & Plan     Diagnoses and all orders for this visit:    1. Essential hypertension (Primary)    2. Coronary artery disease involving native coronary artery of native heart without angina pectoris    3. Dyslipidemia    4. History of coronary angioplasty with insertion of stent    5. Severe late onset Alzheimer's dementia with anxiety    6. Palpitations  -     rosuvastatin (CRESTOR) 10 MG tablet; Take 2 tablets by mouth Daily.  Dispense: 90 tablet; Refill: 1    7. Chest pain, precordial  -     rosuvastatin (CRESTOR) 10 MG tablet; Take 2 tablets by mouth Daily.  Dispense: 90 tablet; Refill: 1    8. Abnormal CT of the chest  -     rosuvastatin (CRESTOR) 10 MG tablet; Take 2 tablets by mouth Daily.  Dispense: 90 tablet; Refill: 1    9. 3-vessel CAD  -     rosuvastatin (CRESTOR) 10 MG tablet; Take 2 tablets by mouth Daily.  Dispense: 90 tablet; Refill: 1    10. Primary hypertension  -     rosuvastatin (CRESTOR) 10 MG tablet; Take 2 tablets  by mouth Daily.  Dispense: 90 tablet; Refill: 1    1.  Patient with advanced dementia, no reported complaints so we will continue current management including Plavix  2.  I reviewed the labs with very low LDL of only 14 some going to cut down the dose of the rosuvastatin to 10 mg/day  3.  He had some extrasystoles but appears otherwise asymptomatic we will monitor  4.  Blood pressure is well-controlled we will continue current management  Assessment & Plan         Return if symptoms worsen or fail to improve.      Advance Care Planning   ACP discussion was held with the patient during this visit. Patient has an advance directive (not in EMR), copy requested.             MEDS ORDERED DURING VISIT:  New Medications Ordered This Visit   Medications    rosuvastatin (CRESTOR) 10 MG tablet     Sig: Take 2 tablets by mouth Daily.     Dispense:  90 tablet     Refill:  1         As always, Mary Torres MD  I appreciate very much the opportunity to participate in the cardiovascular care of your patients. Please do not hesitate to call me with any questions with regards to Miguel Allen evaluation and management.     Patient or patient representative verbalized consent for the use of Ambient Listening during the visit with  Mena Durant MD for chart documentation. 12/18/2024  15:51 EST       This document has been electronically signed by Mena Durant MD  December 18, 2024 16:17 EST    This note is dictated utilizing voice recognition software.